# Patient Record
Sex: MALE | Race: WHITE | NOT HISPANIC OR LATINO | Employment: OTHER | URBAN - METROPOLITAN AREA
[De-identification: names, ages, dates, MRNs, and addresses within clinical notes are randomized per-mention and may not be internally consistent; named-entity substitution may affect disease eponyms.]

---

## 2018-07-21 ENCOUNTER — APPOINTMENT (OUTPATIENT)
Dept: CT IMAGING | Facility: HOSPITAL | Age: 63
End: 2018-07-21

## 2018-07-21 ENCOUNTER — APPOINTMENT (OUTPATIENT)
Dept: GENERAL RADIOLOGY | Facility: HOSPITAL | Age: 63
End: 2018-07-21

## 2018-07-21 ENCOUNTER — APPOINTMENT (OUTPATIENT)
Dept: ULTRASOUND IMAGING | Facility: HOSPITAL | Age: 63
End: 2018-07-21

## 2018-07-21 ENCOUNTER — HOSPITAL ENCOUNTER (EMERGENCY)
Facility: HOSPITAL | Age: 63
Discharge: HOME | End: 2018-07-21
Attending: EMERGENCY MEDICINE | Admitting: EMERGENCY MEDICINE

## 2018-07-21 VITALS
SYSTOLIC BLOOD PRESSURE: 110 MMHG | HEIGHT: 78 IN | TEMPERATURE: 97.7 F | BODY MASS INDEX: 36.45 KG/M2 | WEIGHT: 315 LBS | OXYGEN SATURATION: 94 % | HEART RATE: 58 BPM | RESPIRATION RATE: 16 BRPM | DIASTOLIC BLOOD PRESSURE: 63 MMHG

## 2018-07-21 DIAGNOSIS — E66.9 DIABETES MELLITUS TYPE 2 IN OBESE (HCC): ICD-10-CM

## 2018-07-21 DIAGNOSIS — Z86.79 HISTORY OF CORONARY ARTERY DISEASE: ICD-10-CM

## 2018-07-21 DIAGNOSIS — K83.8 BILIARY SLUDGE DETERMINED BY ULTRASOUND: ICD-10-CM

## 2018-07-21 DIAGNOSIS — Z86.79 HISTORY OF HYPERTENSION: ICD-10-CM

## 2018-07-21 DIAGNOSIS — K80.50 BILIARY COLIC: Primary | ICD-10-CM

## 2018-07-21 DIAGNOSIS — E11.69 DIABETES MELLITUS TYPE 2 IN OBESE (HCC): ICD-10-CM

## 2018-07-21 DIAGNOSIS — Z86.79 HISTORY OF ATRIAL FIBRILLATION: ICD-10-CM

## 2018-07-21 DIAGNOSIS — R07.9 RIGHT-SIDED CHEST PAIN: ICD-10-CM

## 2018-07-21 LAB
ALBUMIN SERPL-MCNC: 4.6 G/DL (ref 3.2–4.8)
ALBUMIN/GLOB SERPL: 1.5 G/DL (ref 1.5–2.5)
ALP SERPL-CCNC: 59 U/L (ref 25–100)
ALT SERPL W P-5'-P-CCNC: 46 U/L (ref 7–40)
ANION GAP SERPL CALCULATED.3IONS-SCNC: 9 MMOL/L (ref 3–11)
AST SERPL-CCNC: 38 U/L (ref 0–33)
BASOPHILS # BLD AUTO: 0.03 10*3/MM3 (ref 0–0.2)
BASOPHILS NFR BLD AUTO: 0.5 % (ref 0–1)
BILIRUB SERPL-MCNC: 0.4 MG/DL (ref 0.3–1.2)
BILIRUB UR QL STRIP: NEGATIVE
BNP SERPL-MCNC: 9 PG/ML (ref 0–100)
BUN BLD-MCNC: 28 MG/DL (ref 9–23)
BUN/CREAT SERPL: 23 (ref 7–25)
CALCIUM SPEC-SCNC: 9.7 MG/DL (ref 8.7–10.4)
CHLORIDE SERPL-SCNC: 106 MMOL/L (ref 99–109)
CLARITY UR: CLEAR
CO2 SERPL-SCNC: 24 MMOL/L (ref 20–31)
COLOR UR: YELLOW
CREAT BLD-MCNC: 1.22 MG/DL (ref 0.6–1.3)
DEPRECATED RDW RBC AUTO: 44.1 FL (ref 37–54)
EOSINOPHIL # BLD AUTO: 0.14 10*3/MM3 (ref 0–0.3)
EOSINOPHIL NFR BLD AUTO: 2.4 % (ref 0–3)
ERYTHROCYTE [DISTWIDTH] IN BLOOD BY AUTOMATED COUNT: 12.7 % (ref 11.3–14.5)
GFR SERPL CREATININE-BSD FRML MDRD: 60 ML/MIN/1.73
GLOBULIN UR ELPH-MCNC: 3 GM/DL
GLUCOSE BLD-MCNC: 182 MG/DL (ref 70–100)
GLUCOSE UR STRIP-MCNC: NEGATIVE MG/DL
HCT VFR BLD AUTO: 41.2 % (ref 38.9–50.9)
HGB BLD-MCNC: 13.6 G/DL (ref 13.1–17.5)
HGB UR QL STRIP.AUTO: NEGATIVE
HOLD SPECIMEN: NORMAL
HOLD SPECIMEN: NORMAL
IMM GRANULOCYTES # BLD: 0.02 10*3/MM3 (ref 0–0.03)
IMM GRANULOCYTES NFR BLD: 0.3 % (ref 0–0.6)
KETONES UR QL STRIP: NEGATIVE
LEUKOCYTE ESTERASE UR QL STRIP.AUTO: NEGATIVE
LIPASE SERPL-CCNC: 41 U/L (ref 6–51)
LYMPHOCYTES # BLD AUTO: 1.17 10*3/MM3 (ref 0.6–4.8)
LYMPHOCYTES NFR BLD AUTO: 19.8 % (ref 24–44)
MCH RBC QN AUTO: 31.5 PG (ref 27–31)
MCHC RBC AUTO-ENTMCNC: 33 G/DL (ref 32–36)
MCV RBC AUTO: 95.4 FL (ref 80–99)
MONOCYTES # BLD AUTO: 0.26 10*3/MM3 (ref 0–1)
MONOCYTES NFR BLD AUTO: 4.4 % (ref 0–12)
NEUTROPHILS # BLD AUTO: 4.29 10*3/MM3 (ref 1.5–8.3)
NEUTROPHILS NFR BLD AUTO: 72.6 % (ref 41–71)
NITRITE UR QL STRIP: NEGATIVE
PH UR STRIP.AUTO: <=5 [PH] (ref 5–8)
PLATELET # BLD AUTO: 201 10*3/MM3 (ref 150–450)
PMV BLD AUTO: 10.3 FL (ref 6–12)
POTASSIUM BLD-SCNC: 4.8 MMOL/L (ref 3.5–5.5)
PROT SERPL-MCNC: 7.6 G/DL (ref 5.7–8.2)
PROT UR QL STRIP: NEGATIVE
RBC # BLD AUTO: 4.32 10*6/MM3 (ref 4.2–5.76)
SODIUM BLD-SCNC: 139 MMOL/L (ref 132–146)
SP GR UR STRIP: 1.03 (ref 1–1.03)
TROPONIN I SERPL-MCNC: 0 NG/ML (ref 0–0.07)
TROPONIN I SERPL-MCNC: 0 NG/ML (ref 0–0.07)
UROBILINOGEN UR QL STRIP: NORMAL
WBC NRBC COR # BLD: 5.91 10*3/MM3 (ref 3.5–10.8)
WHOLE BLOOD HOLD SPECIMEN: NORMAL
WHOLE BLOOD HOLD SPECIMEN: NORMAL

## 2018-07-21 PROCEDURE — 96374 THER/PROPH/DIAG INJ IV PUSH: CPT

## 2018-07-21 PROCEDURE — 81003 URINALYSIS AUTO W/O SCOPE: CPT | Performed by: EMERGENCY MEDICINE

## 2018-07-21 PROCEDURE — 83690 ASSAY OF LIPASE: CPT | Performed by: EMERGENCY MEDICINE

## 2018-07-21 PROCEDURE — 93005 ELECTROCARDIOGRAM TRACING: CPT | Performed by: EMERGENCY MEDICINE

## 2018-07-21 PROCEDURE — 25010000002 ONDANSETRON PER 1 MG: Performed by: EMERGENCY MEDICINE

## 2018-07-21 PROCEDURE — 25010000002 KETOROLAC TROMETHAMINE PER 15 MG: Performed by: EMERGENCY MEDICINE

## 2018-07-21 PROCEDURE — 96375 TX/PRO/DX INJ NEW DRUG ADDON: CPT

## 2018-07-21 PROCEDURE — 71275 CT ANGIOGRAPHY CHEST: CPT

## 2018-07-21 PROCEDURE — 0 IOPAMIDOL PER 1 ML: Performed by: EMERGENCY MEDICINE

## 2018-07-21 PROCEDURE — 76705 ECHO EXAM OF ABDOMEN: CPT

## 2018-07-21 PROCEDURE — 80053 COMPREHEN METABOLIC PANEL: CPT | Performed by: EMERGENCY MEDICINE

## 2018-07-21 PROCEDURE — 83880 ASSAY OF NATRIURETIC PEPTIDE: CPT | Performed by: EMERGENCY MEDICINE

## 2018-07-21 PROCEDURE — 85025 COMPLETE CBC W/AUTO DIFF WBC: CPT | Performed by: EMERGENCY MEDICINE

## 2018-07-21 PROCEDURE — 99284 EMERGENCY DEPT VISIT MOD MDM: CPT

## 2018-07-21 PROCEDURE — 84484 ASSAY OF TROPONIN QUANT: CPT

## 2018-07-21 RX ORDER — SODIUM CHLORIDE 0.9 % (FLUSH) 0.9 %
10 SYRINGE (ML) INJECTION AS NEEDED
Status: DISCONTINUED | OUTPATIENT
Start: 2018-07-21 | End: 2018-07-21 | Stop reason: HOSPADM

## 2018-07-21 RX ORDER — DICYCLOMINE HYDROCHLORIDE 10 MG/1
20 CAPSULE ORAL ONCE
Status: COMPLETED | OUTPATIENT
Start: 2018-07-21 | End: 2018-07-21

## 2018-07-21 RX ORDER — ASPIRIN 81 MG/1
81 TABLET ORAL DAILY
COMMUNITY

## 2018-07-21 RX ORDER — CHLORAL HYDRATE 500 MG
1000 CAPSULE ORAL NIGHTLY
COMMUNITY

## 2018-07-21 RX ORDER — ROSUVASTATIN CALCIUM 20 MG/1
20 TABLET, COATED ORAL DAILY
COMMUNITY
End: 2020-09-10

## 2018-07-21 RX ORDER — METOPROLOL TARTRATE 50 MG/1
50 TABLET, FILM COATED ORAL 2 TIMES DAILY
COMMUNITY
End: 2019-07-24

## 2018-07-21 RX ORDER — ALLOPURINOL 300 MG/1
300 TABLET ORAL DAILY
COMMUNITY
End: 2019-07-24

## 2018-07-21 RX ORDER — LISINOPRIL 20 MG/1
20 TABLET ORAL DAILY
COMMUNITY

## 2018-07-21 RX ORDER — KETOROLAC TROMETHAMINE 15 MG/ML
10 INJECTION, SOLUTION INTRAMUSCULAR; INTRAVENOUS ONCE
Status: COMPLETED | OUTPATIENT
Start: 2018-07-21 | End: 2018-07-21

## 2018-07-21 RX ORDER — ONDANSETRON 2 MG/ML
4 INJECTION INTRAMUSCULAR; INTRAVENOUS ONCE
Status: COMPLETED | OUTPATIENT
Start: 2018-07-21 | End: 2018-07-21

## 2018-07-21 RX ORDER — MAGNESIUM OXIDE 400 MG/1
400 TABLET ORAL DAILY
COMMUNITY
End: 2020-09-10

## 2018-07-21 RX ORDER — PROBENECID 500 MG/1
500 TABLET, FILM COATED ORAL 2 TIMES DAILY
COMMUNITY
End: 2019-07-24

## 2018-07-21 RX ORDER — RANITIDINE 150 MG/1
150 TABLET ORAL 2 TIMES DAILY
Qty: 28 TABLET | Refills: 0 | Status: SHIPPED | OUTPATIENT
Start: 2018-07-21 | End: 2019-07-24

## 2018-07-21 RX ORDER — SOTALOL HYDROCHLORIDE 80 MG/1
80 TABLET ORAL 2 TIMES DAILY
COMMUNITY
End: 2019-08-23 | Stop reason: HOSPADM

## 2018-07-21 RX ORDER — BUPROPION HYDROCHLORIDE 150 MG/1
300 TABLET ORAL DAILY
COMMUNITY
End: 2019-07-24

## 2018-07-21 RX ORDER — TRAMADOL HYDROCHLORIDE 50 MG/1
50 TABLET ORAL EVERY 6 HOURS PRN
Qty: 12 TABLET | Refills: 0 | Status: SHIPPED | OUTPATIENT
Start: 2018-07-21 | End: 2019-07-24

## 2018-07-21 RX ADMIN — KETOROLAC TROMETHAMINE 10 MG: 15 INJECTION, SOLUTION INTRAMUSCULAR; INTRAVENOUS at 12:50

## 2018-07-21 RX ADMIN — ONDANSETRON 4 MG: 2 INJECTION INTRAMUSCULAR; INTRAVENOUS at 12:48

## 2018-07-21 RX ADMIN — IOPAMIDOL 75 ML: 755 INJECTION, SOLUTION INTRAVENOUS at 12:23

## 2018-07-21 RX ADMIN — Medication 10 ML: at 12:50

## 2018-07-21 RX ADMIN — DICYCLOMINE HYDROCHLORIDE 20 MG: 10 CAPSULE ORAL at 12:48

## 2018-07-21 RX ADMIN — Medication 10 ML: at 12:49

## 2018-08-07 ENCOUNTER — TRANSCRIBE ORDERS (OUTPATIENT)
Dept: ADMINISTRATIVE | Facility: HOSPITAL | Age: 63
End: 2018-08-07

## 2018-08-07 ENCOUNTER — TRANSCRIBE ORDERS (OUTPATIENT)
Dept: LAB | Facility: HOSPITAL | Age: 63
End: 2018-08-07

## 2018-08-07 ENCOUNTER — LAB (OUTPATIENT)
Dept: LAB | Facility: HOSPITAL | Age: 63
End: 2018-08-07

## 2018-08-07 DIAGNOSIS — R10.11 ABDOMINAL PAIN, RIGHT UPPER QUADRANT: ICD-10-CM

## 2018-08-07 DIAGNOSIS — R10.11 RUQ ABDOMINAL PAIN: Primary | ICD-10-CM

## 2018-08-07 DIAGNOSIS — R10.11 ABDOMINAL PAIN, RIGHT UPPER QUADRANT: Primary | ICD-10-CM

## 2018-08-07 PROCEDURE — 83835 ASSAY OF METANEPHRINES: CPT

## 2018-08-07 PROCEDURE — 36415 COLL VENOUS BLD VENIPUNCTURE: CPT

## 2018-08-09 LAB
METANEPHRINE, PL: 14 PG/ML (ref 0–62)
NORMETANEPHRINE, PL: 77 PG/ML (ref 0–145)

## 2018-08-17 ENCOUNTER — HOSPITAL ENCOUNTER (OUTPATIENT)
Dept: NUCLEAR MEDICINE | Facility: HOSPITAL | Age: 63
Discharge: HOME | End: 2018-08-17
Attending: SURGERY

## 2018-08-17 DIAGNOSIS — R10.11 RUQ ABDOMINAL PAIN: ICD-10-CM

## 2018-08-17 PROCEDURE — 78227 HEPATOBIL SYST IMAGE W/DRUG: CPT

## 2018-08-17 PROCEDURE — A9537 TC99M MEBROFENIN: HCPCS | Performed by: SURGERY

## 2018-08-17 PROCEDURE — 0 TECHNETIUM TC 99M MEBROFENIN KIT: Performed by: SURGERY

## 2018-08-17 PROCEDURE — 25010000002 SINCALIDE PER 5 MCG: Performed by: SURGERY

## 2018-08-17 RX ORDER — KIT FOR THE PREPARATION OF TECHNETIUM TC 99M MEBROFENIN 45 MG/10ML
1 INJECTION, POWDER, LYOPHILIZED, FOR SOLUTION INTRAVENOUS
Status: COMPLETED | OUTPATIENT
Start: 2018-08-17 | End: 2018-08-17

## 2018-08-17 RX ADMIN — MEBROFENIN 1 DOSE: 45 INJECTION, POWDER, LYOPHILIZED, FOR SOLUTION INTRAVENOUS at 10:35

## 2018-08-17 RX ADMIN — SINCALIDE 2.9 MCG: 5 INJECTION, POWDER, LYOPHILIZED, FOR SOLUTION INTRAVENOUS at 11:45

## 2018-08-29 ENCOUNTER — OFFICE VISIT (OUTPATIENT)
Dept: GASTROENTEROLOGY | Facility: CLINIC | Age: 63
End: 2018-08-29

## 2018-08-29 VITALS
BODY MASS INDEX: 36.45 KG/M2 | SYSTOLIC BLOOD PRESSURE: 132 MMHG | DIASTOLIC BLOOD PRESSURE: 82 MMHG | HEART RATE: 67 BPM | OXYGEN SATURATION: 95 % | WEIGHT: 315 LBS | HEIGHT: 78 IN | RESPIRATION RATE: 16 BRPM | TEMPERATURE: 97.7 F

## 2018-08-29 DIAGNOSIS — R10.9 ABDOMINAL PAIN, UNSPECIFIED ABDOMINAL LOCATION: Primary | ICD-10-CM

## 2018-08-29 PROCEDURE — 99204 OFFICE O/P NEW MOD 45 MIN: CPT | Performed by: INTERNAL MEDICINE

## 2018-08-29 RX ORDER — PANTOPRAZOLE SODIUM 40 MG/1
40 TABLET, DELAYED RELEASE ORAL 2 TIMES DAILY
Qty: 180 TABLET | Refills: 3 | Status: SHIPPED | OUTPATIENT
Start: 2018-08-29

## 2018-08-29 NOTE — PROGRESS NOTES
PCP: Monika العراقي APRN    Chief Complaint   Patient presents with   • Abdominal Pain        History of Present Illness:   Raoul Roberto is a 62 y.o. male who presents to the GI clinic for abdominal pain. Reports a history of atrial fibrillation, dm2 and 3 months of epigastric pain. Also has a history of ruq pain that worsened on inspiration. He has had an u/s showing gallbladder sludge and an unremarkable HIDA scan. T bili 0.4 , alk phos < 70.  Now, he reports postprandial diaphoresis and epigastric discomfort associated with 3 loose dark stools. Stools can appear black but mostly green. Pain epigastric intermittent, 5-7/10, nonradiating.    Past Medical History:   Diagnosis Date   • Atrial fibrillation (CMS/HCC)    • Diabetes mellitus (CMS/HCC)    • Gout    • Hyperlipidemia    • Hypertension        Past Surgical History:   Procedure Laterality Date   • KNEE SURGERY     • ROTATOR CUFF REPAIR           Current Outpatient Prescriptions:   •  allopurinol (ZYLOPRIM) 300 MG tablet, Take 300 mg by mouth Daily., Disp: , Rfl:   •  aspirin 81 MG EC tablet, Take 81 mg by mouth Daily., Disp: , Rfl:   •  buPROPion XL (WELLBUTRIN XL) 150 MG 24 hr tablet, Take 300 mg by mouth Daily., Disp: , Rfl:   •  hyoscyamine (LEVSIN) 0.125 MG SL tablet, Take 1 tablet by mouth Every 4 (Four) Hours As Needed for Cramping., Disp: 15 tablet, Rfl: 0  •  lisinopril (PRINIVIL,ZESTRIL) 20 MG tablet, Take 20 mg by mouth Daily., Disp: , Rfl:   •  magnesium oxide (MAG-OX) 400 MG tablet, Take 400 mg by mouth Daily., Disp: , Rfl:   •  metFORMIN (GLUCOPHAGE) 500 MG tablet, Take 500 mg by mouth 3 (Three) Times a Day., Disp: , Rfl:   •  metoprolol tartrate (LOPRESSOR) 50 MG tablet, Take 50 mg by mouth 2 (Two) Times a Day., Disp: , Rfl:   •  Omega-3 Fatty Acids (FISH OIL) 1000 MG capsule capsule, Take  by mouth Daily With Breakfast., Disp: , Rfl:   •  probenecid (BENEMID) 500 MG tablet, Take 500 mg by mouth 2 (Two) Times a Day., Disp: , Rfl:   •   raNITIdine (ZANTAC) 150 MG tablet, Take 1 tablet by mouth 2 (Two) Times a Day., Disp: 28 tablet, Rfl: 0  •  rivaroxaban (XARELTO) 20 MG tablet, Take 20 mg by mouth Daily., Disp: , Rfl:   •  rosuvastatin (CRESTOR) 20 MG tablet, Take 20 mg by mouth Daily., Disp: , Rfl:   •  sotalol (BETAPACE) 80 MG tablet, Take 80 mg by mouth 2 (Two) Times a Day., Disp: , Rfl:   •  traMADol (ULTRAM) 50 MG tablet, Take 1 tablet by mouth Every 6 (Six) Hours As Needed for Moderate Pain ., Disp: 12 tablet, Rfl: 0    No Known Allergies    History reviewed. No pertinent family history.    Social History     Social History   • Marital status:      Spouse name: N/A   • Number of children: N/A   • Years of education: N/A     Occupational History   • Not on file.     Social History Main Topics   • Smoking status: Never Smoker   • Smokeless tobacco: Not on file   • Alcohol use Yes      Comment: socially   • Drug use: No   • Sexual activity: Defer     Other Topics Concern   • Not on file     Social History Narrative   • No narrative on file       Review of Systems   Constitutional: Positive for appetite change.        Abnormal sweating   Gastrointestinal: Positive for abdominal distention, abdominal pain (Cramping, sharp pain in RLQ), diarrhea, nausea and vomiting.   Musculoskeletal: Positive for back pain (Right shoulder area).   Allergic/Immunologic: Positive for food allergies (Wheat, grains, tree nuts).   All other systems reviewed and are negative.    All other systems reviewed and are negative.    Vitals:    08/29/18 1108   BP: 132/82   Pulse: 67   Resp: 16   Temp: 97.7 °F (36.5 °C)   SpO2: 95%       Physical Exam  General Appearance:  Vitals as above. no acute distress  Head/face:  Normocephalic, atraumatic  Eyes:   EOMI, no conjunctivitis or icterus   Nose/Sinuses:  Nares patent bilaterally without discharge or lesions  Mouth/Throat:  Posterior pharynx is pink without drainage or exudates;  dentition is in good condition and  repair  Neck:  trachea is midline, no thyromegaly  Lungs:  Clear to auscultation bilaterally  Heart:  irreg irreg rhythm  Abdomen:  Soft, non-tender to palpation, no obvious masses, bowel sounds positive in four quadrants; no abdominal bruits; no guarding or rebound tenderness  Neurologic:  Alert; no focal deficits; age appropriate behavior and speech  Psychiatric: mood and affect are congruent  Vascular: extremities without edema  Skin: no rash or cyanosis.      Assessment/Plan  1.) Post prandial epigastric pain, persistent x 3 months  2.) history of ruq pain  3.) Xarelto and asa use  4.) chronic a. Fib  5.) Post prandial diaphoresis  6.) History of testosterone use  7.) Biliary sludge    Not sure what to make of his post prandial diaphoresis and I see that his pcp has assessed urinary metanephrines.  He does report a history of hypopituitarism and I defer to pcp/endocrinologist.     His cmp is without signs of biliary obstruction and perhaps he is having PUD/gastritis.  Will assess EGD/colonoscopy (last colon 9 years ago) off xarelto x 48 hours if ok with rx physician and offer empiric rx protonix  while he awaits his cardiology appointment in 2 weeks.  Following his cardiac appointment, will plan on egd/colon unless there are plans for stress testing.    rtc in 3 months  Amador Fernandes MD  8/29/2018

## 2018-09-12 ENCOUNTER — TELEPHONE (OUTPATIENT)
Dept: GASTROENTEROLOGY | Facility: CLINIC | Age: 63
End: 2018-09-12

## 2018-09-12 NOTE — TELEPHONE ENCOUNTER
Patient wife called to let you know that medication is not working. Patient would like to know if he can go ahead a schedule a scope. Wife states patient is not going to see Dr. Dempsey for 1 month.

## 2018-09-27 ENCOUNTER — PREP FOR SURGERY (OUTPATIENT)
Dept: OTHER | Facility: HOSPITAL | Age: 63
End: 2018-09-27

## 2018-09-27 DIAGNOSIS — R10.9 ABDOMINAL PAIN, UNSPECIFIED ABDOMINAL LOCATION: ICD-10-CM

## 2018-09-27 DIAGNOSIS — Z12.11 SCREEN FOR COLON CANCER: Primary | ICD-10-CM

## 2018-09-28 DIAGNOSIS — Z12.11 SCREENING FOR COLON CANCER: Primary | ICD-10-CM

## 2018-09-28 DIAGNOSIS — Z12.11 SCREENING FOR COLON CANCER: ICD-10-CM

## 2018-09-28 RX ORDER — PEG-3350, SODIUM SULFATE, SODIUM CHLORIDE, POTASSIUM CHLORIDE, SODIUM ASCORBATE AND ASCORBIC ACID 7.5-2.691G
KIT ORAL
Qty: 1 EACH | Refills: 0 | Status: SHIPPED | OUTPATIENT
Start: 2018-09-28 | End: 2018-09-28 | Stop reason: SDUPTHER

## 2018-09-28 RX ORDER — PEG-3350, SODIUM SULFATE, SODIUM CHLORIDE, POTASSIUM CHLORIDE, SODIUM ASCORBATE AND ASCORBIC ACID 7.5-2.691G
KIT ORAL
Qty: 1 EACH | Refills: 0 | Status: SHIPPED | OUTPATIENT
Start: 2018-09-28 | End: 2019-07-24

## 2018-10-02 ENCOUNTER — ANESTHESIA EVENT (OUTPATIENT)
Dept: GASTROENTEROLOGY | Facility: HOSPITAL | Age: 63
End: 2018-10-02

## 2018-10-02 ENCOUNTER — ANESTHESIA (OUTPATIENT)
Dept: GASTROENTEROLOGY | Facility: HOSPITAL | Age: 63
End: 2018-10-02

## 2018-10-02 ENCOUNTER — HOSPITAL ENCOUNTER (OUTPATIENT)
Facility: HOSPITAL | Age: 63
Setting detail: HOSPITAL OUTPATIENT SURGERY
Discharge: HOME | End: 2018-10-02
Attending: INTERNAL MEDICINE | Admitting: INTERNAL MEDICINE

## 2018-10-02 VITALS
BODY MASS INDEX: 33.95 KG/M2 | WEIGHT: 309 LBS | SYSTOLIC BLOOD PRESSURE: 122 MMHG | OXYGEN SATURATION: 98 % | DIASTOLIC BLOOD PRESSURE: 67 MMHG | TEMPERATURE: 97.2 F | RESPIRATION RATE: 20 BRPM | HEART RATE: 61 BPM

## 2018-10-02 DIAGNOSIS — R10.9 ABDOMINAL PAIN, UNSPECIFIED ABDOMINAL LOCATION: ICD-10-CM

## 2018-10-02 DIAGNOSIS — Z12.11 SCREEN FOR COLON CANCER: ICD-10-CM

## 2018-10-02 LAB — POTASSIUM BLDA-SCNC: 4.12 MMOL/L (ref 3.5–5.3)

## 2018-10-02 PROCEDURE — S0260 H&P FOR SURGERY: HCPCS | Performed by: INTERNAL MEDICINE

## 2018-10-02 PROCEDURE — 88305 TISSUE EXAM BY PATHOLOGIST: CPT | Performed by: INTERNAL MEDICINE

## 2018-10-02 PROCEDURE — 84132 ASSAY OF SERUM POTASSIUM: CPT | Performed by: ANESTHESIOLOGY

## 2018-10-02 PROCEDURE — 25010000002 PROPOFOL 1000 MG/ML EMULSION: Performed by: NURSE ANESTHETIST, CERTIFIED REGISTERED

## 2018-10-02 RX ORDER — EPHEDRINE SULFATE 50 MG/ML
INJECTION, SOLUTION INTRAVENOUS AS NEEDED
Status: DISCONTINUED | OUTPATIENT
Start: 2018-10-02 | End: 2018-10-02 | Stop reason: SURG

## 2018-10-02 RX ORDER — SODIUM CHLORIDE, SODIUM LACTATE, POTASSIUM CHLORIDE, CALCIUM CHLORIDE 600; 310; 30; 20 MG/100ML; MG/100ML; MG/100ML; MG/100ML
20 INJECTION, SOLUTION INTRAVENOUS CONTINUOUS
Status: DISCONTINUED | OUTPATIENT
Start: 2018-10-02 | End: 2018-10-03 | Stop reason: HOSPADM

## 2018-10-02 RX ORDER — LIDOCAINE HYDROCHLORIDE 10 MG/ML
0.5 INJECTION, SOLUTION EPIDURAL; INFILTRATION; INTRACAUDAL; PERINEURAL ONCE AS NEEDED
Status: CANCELLED | OUTPATIENT
Start: 2018-10-02

## 2018-10-02 RX ORDER — FENTANYL CITRATE 50 UG/ML
50 INJECTION, SOLUTION INTRAMUSCULAR; INTRAVENOUS
Status: DISCONTINUED | OUTPATIENT
Start: 2018-10-02 | End: 2018-10-02 | Stop reason: HOSPADM

## 2018-10-02 RX ORDER — SODIUM CHLORIDE, SODIUM LACTATE, POTASSIUM CHLORIDE, CALCIUM CHLORIDE 600; 310; 30; 20 MG/100ML; MG/100ML; MG/100ML; MG/100ML
9 INJECTION, SOLUTION INTRAVENOUS CONTINUOUS
Status: CANCELLED | OUTPATIENT
Start: 2018-10-02

## 2018-10-02 RX ORDER — FAMOTIDINE 20 MG/1
20 TABLET, FILM COATED ORAL ONCE
Status: CANCELLED | OUTPATIENT
Start: 2018-10-02 | End: 2018-10-02

## 2018-10-02 RX ORDER — FAMOTIDINE 10 MG/ML
20 INJECTION, SOLUTION INTRAVENOUS ONCE
Status: CANCELLED | OUTPATIENT
Start: 2018-10-02 | End: 2018-10-02

## 2018-10-02 RX ORDER — SODIUM CHLORIDE 0.9 % (FLUSH) 0.9 %
3 SYRINGE (ML) INJECTION EVERY 12 HOURS SCHEDULED
Status: CANCELLED | OUTPATIENT
Start: 2018-10-02

## 2018-10-02 RX ORDER — SODIUM CHLORIDE 0.9 % (FLUSH) 0.9 %
3-10 SYRINGE (ML) INJECTION AS NEEDED
Status: CANCELLED | OUTPATIENT
Start: 2018-10-02

## 2018-10-02 RX ORDER — LIDOCAINE HYDROCHLORIDE 10 MG/ML
INJECTION, SOLUTION EPIDURAL; INFILTRATION; INTRACAUDAL; PERINEURAL AS NEEDED
Status: DISCONTINUED | OUTPATIENT
Start: 2018-10-02 | End: 2018-10-02 | Stop reason: SURG

## 2018-10-02 RX ADMIN — EPHEDRINE SULFATE 10 MG: 50 INJECTION INTRAMUSCULAR; INTRAVENOUS; SUBCUTANEOUS at 08:29

## 2018-10-02 RX ADMIN — EPHEDRINE SULFATE 10 MG: 50 INJECTION INTRAMUSCULAR; INTRAVENOUS; SUBCUTANEOUS at 08:36

## 2018-10-02 RX ADMIN — LIDOCAINE HYDROCHLORIDE 50 MG: 10 INJECTION, SOLUTION EPIDURAL; INFILTRATION; INTRACAUDAL; PERINEURAL at 08:14

## 2018-10-02 RX ADMIN — PROPOFOL 200 MCG/KG/MIN: 10 INJECTION, EMULSION INTRAVENOUS at 08:15

## 2018-10-02 RX ADMIN — SODIUM CHLORIDE, POTASSIUM CHLORIDE, SODIUM LACTATE AND CALCIUM CHLORIDE: 600; 310; 30; 20 INJECTION, SOLUTION INTRAVENOUS at 08:09

## 2018-10-02 NOTE — ANESTHESIA POSTPROCEDURE EVALUATION
Patient: Raoul Roberto    Procedure Summary     Date:  10/02/18 Room / Location:   [facility] /  [facility]    Anesthesia Start:  0810 Anesthesia Stop:      Procedures:       COLONOSCOPY (N/A )      ESOPHAGOGASTRODUODENOSCOPY (N/A ) Diagnosis:       Screen for colon cancer      Abdominal pain, unspecified abdominal location      (Screen for colon cancer [Z12.11])      (Abdominal pain, unspecified abdominal location [R10.9])    Surgeon:  Guille Addison MD Provider:  Bradly Chavez MD    Anesthesia Type:  general ASA Status:  3          Anesthesia Type: general  Last vitals  BP   110/72   Temp   98   Pulse   68   Resp   16     SpO2   99     Post Anesthesia Care and Evaluation    Patient location during evaluation: PACU  Patient participation: complete - patient participated  Level of consciousness: awake and responsive to verbal stimuli  Pain score: 2  Pain management: adequate  Airway patency: patent  Anesthetic complications: No anesthetic complications    Cardiovascular status: acceptable  Respiratory status: acceptable  Hydration status: acceptable    Comments: Pt awake and responsive. SV. VSS. Report to RN. Patient Vitals in the past 24 hrs:  10/02/18 0701, BP:161/70, Temp:97.5 °F (36.4 °C), Temp src:Tympanic, Pulse:66, Resp:16, SpO2:96 %, Weight:(!) 140 kg (309 lb)  133/78. p 72. r 16. t 98.1

## 2018-10-02 NOTE — H&P
[facility]    Referring Provider: Guille Addison MD    Primary Care Provider: Monika العراقي APRN    Reason for Consultation: abd pain    Chief complaint abd pain    History of present illness:  Raoul Roberto is a 62 y.o. male who is referred for EGD and colonoscopy.  Has epigastric pain for last 3-4 mo. Has lost about 40 lbs.  Has been on PPI.  Has diarrhea after he eats.  Last colonoscopy 9 yrs ago.  Had negative cardiac workup and GB workup    Allergies:  Patient has no known allergies.    Scheduled Meds:        Infusions:    lactated ringers 20 mL/hr       PRN Meds:      Home Meds:  Prescriptions Prior to Admission   Medication Sig Dispense Refill Last Dose   • allopurinol (ZYLOPRIM) 300 MG tablet Take 300 mg by mouth Daily.   10/2/2018 at 0430   • aspirin 81 MG EC tablet Take 81 mg by mouth Daily.   10/1/2018 at pm   • buPROPion XL (WELLBUTRIN XL) 150 MG 24 hr tablet Take 300 mg by mouth Daily.   10/1/2018 at 1900   • lisinopril (PRINIVIL,ZESTRIL) 20 MG tablet Take 20 mg by mouth Daily.   10/1/2018 at 1900   • magnesium oxide (MAG-OX) 400 MG tablet Take 400 mg by mouth Daily.   10/1/2018 at 1900   • metFORMIN (GLUCOPHAGE) 500 MG tablet Take 500 mg by mouth 3 (Three) Times a Day.   10/1/2018 at 1900   • metoprolol tartrate (LOPRESSOR) 50 MG tablet Take 50 mg by mouth 2 (Two) Times a Day.   10/2/2018 at 0430   • Omega-3 Fatty Acids (FISH OIL) 1000 MG capsule capsule Take  by mouth Daily With Breakfast.   10/1/2018 at 1900   • pantoprazole (PROTONIX) 40 MG EC tablet Take 1 tablet by mouth 2 (Two) Times a Day. 180 tablet 3 10/2/2018 at 0430   • PEG-KCl-NaCl-NaSulf-Na Asc-C (MOVIPREP) 100 g reconstituted solution powder Dispense 1 kit. Follow instructions that were mailed to your home. If you didn't receive these call [phone].. 1 each 0 10/1/2018 at Unknown time   • probenecid (BENEMID) 500 MG tablet Take 500 mg by mouth 2 (Two) Times a Day.   10/2/2018 at 0430   • raNITIdine (ZANTAC) 150  MG tablet Take 1 tablet by mouth 2 (Two) Times a Day. 28 tablet 0 10/2/2018 at Unknown time   • rosuvastatin (CRESTOR) 20 MG tablet Take 20 mg by mouth Daily.   10/1/2018 at 1900   • sotalol (BETAPACE) 80 MG tablet Take 80 mg by mouth 2 (Two) Times a Day.   10/2/2018 at 0430   • hyoscyamine (LEVSIN) 0.125 MG SL tablet Take 1 tablet by mouth Every 4 (Four) Hours As Needed for Cramping. 15 tablet 0 Unknown at Unknown time   • rivaroxaban (XARELTO) 20 MG tablet Take 20 mg by mouth Daily.   9/29/2018   • traMADol (ULTRAM) 50 MG tablet Take 1 tablet by mouth Every 6 (Six) Hours As Needed for Moderate Pain . 12 tablet 0 Unknown at Unknown time       ROS: Review of Systems   Constitutional: Positive for unexpected weight change.   HENT: Negative for trouble swallowing.    Respiratory: Negative for shortness of breath.    Cardiovascular: Negative for chest pain.   Gastrointestinal: Positive for abdominal pain and diarrhea.   Genitourinary: Negative for dysuria.   Musculoskeletal: Negative for arthralgias.   Skin: Negative for color change.   Allergic/Immunologic: Negative for immunocompromised state.   Neurological: Negative for weakness.   Hematological: Negative for adenopathy.   Psychiatric/Behavioral: Negative for agitation.       PAST MED HX: Pt  has a past medical history of Arthritis; Atrial fibrillation (CMS/HCC); Diabetes mellitus (CMS/HCC); Gout; Hyperlipidemia; Hypertension; Kidney stone; and Seasonal allergies.  PAST SURG HX: Pt  has a past surgical history that includes Knee surgery and Rotator cuff repair.  FAM HX: family history is not on file.  SOC HX: Pt  reports that he has never smoked. He does not have any smokeless tobacco history on file. He reports that he drinks alcohol. He reports that he does not use drugs.    /70 (BP Location: Right arm, Patient Position: Lying)   Pulse 66   Temp 97.5 °F (36.4 °C) (Tympanic)   Resp 16   Wt (!) 140 kg (309 lb)   SpO2 96%   BMI 33.95 kg/m²     Physical  Exam  Wt Readings from Last 3 Encounters:   10/02/18 (!) 140 kg (309 lb)   08/29/18 (!) 146 kg (322 lb)   07/21/18 (!) 147 kg (323 lb)   ,body mass index is 33.95 kg/m².    General Well developed; well nourished; no acute distress.   Obese  ENT Good dentition.  Oral mucosa pink & moist without thrush or lesions.    Neck Neck supple; trachea midline. No thyromegaly  Resp CTA; no rhonchi, rales, or wheezes.  Respiration effort normal  CV RRR; ; no M/R/G. No lower extremity edema  GI Abd soft, NT, ND, normal active bowel sounds.  No HSM.  No abd hernia  Skin No rash; no lesions; no bruises.  Skin turgor normal  Musc No clubbing; no cyanosis.    Psych Oriented to time, place, and person.  Appropriate affect      Results Review:   I reviewed the patient's new clinical results.    Lab Results   Component Value Date    WBC 5.91 07/21/2018    HGB 13.6 07/21/2018    HCT 41.2 07/21/2018    MCV 95.4 07/21/2018     07/21/2018       Lab Results   Component Value Date    GLUCOSE 182 (H) 07/21/2018    BUN 28 (H) 07/21/2018    CREATININE 1.22 07/21/2018    EGFRIFNONA 60 (L) 07/21/2018    BCR 23.0 07/21/2018    CO2 24.0 07/21/2018    CALCIUM 9.7 07/21/2018    ALBUMIN 4.60 07/21/2018    AST 38 (H) 07/21/2018    ALT 46 (H) 07/21/2018       ASSESSMENTS/PLANS    # 1 abd pain  #2 wt loss      Plan EGD and colon        I discussed the patients findings and my recommendations with patient    Guille Addison MD  10/02/18  8:09 AM

## 2018-10-02 NOTE — DISCHARGE INSTRUCTIONS
General Anesthesia, Adult, Care After  These instructions provide you with information about caring for yourself after your procedure. Your health care provider may also give you more specific instructions. Your treatment has been planned according to current medical practices, but problems sometimes occur. Call your health care provider if you have any problems or questions after your procedure.  What can I expect after the procedure?  After the procedure, it is common to have:  · Vomiting.  · A sore throat.  · Mental slowness.    It is common to feel:  · Nauseous.  · Cold or shivery.  · Sleepy.  · Tired.  · Sore or achy, even in parts of your body where you did not have surgery.    Follow these instructions at home:  For at least 24 hours after the procedure:  · Do not:  ? Participate in activities where you could fall or become injured.  ? Drive.  ? Use heavy machinery.  ? Drink alcohol.  ? Take sleeping pills or medicines that cause drowsiness.  ? Make important decisions or sign legal documents.  ? Take care of children on your own.  · Rest.  Eating and drinking  · If you vomit, drink water, juice, or soup when you can drink without vomiting.  · Drink enough fluid to keep your urine clear or pale yellow.  · Make sure you have little or no nausea before eating solid foods.  · Follow the diet recommended by your health care provider.  General instructions  · Have a responsible adult stay with you until you are awake and alert.  · Return to your normal activities as told by your health care provider. Ask your health care provider what activities are safe for you.  · Take over-the-counter and prescription medicines only as told by your health care provider.  · If you smoke, do not smoke without supervision.  · Keep all follow-up visits as told by your health care provider. This is important.  Contact a health care provider if:  · You continue to have nausea or vomiting at home, and medicines are not helpful.  · You  cannot drink fluids or start eating again.  · You cannot urinate after 8-12 hours.  · You develop a skin rash.  · You have fever.  · You have increasing redness at the site of your procedure.  Get help right away if:  · You have difficulty breathing.  · You have chest pain.  · You have unexpected bleeding.  · You feel that you are having a life-threatening or urgent problem.  This information is not intended to replace advice given to you by your health care provider. Make sure you discuss any questions you have with your health care provider.  Document Released: 03/26/2002 Document Revised: 05/22/2017 Document Reviewed: 12/01/2016  [employer] Interactive Patient Education © 2018 [employer]  Esophagogastroduodenoscopy, Care After  Refer to this sheet in the next few weeks. These instructions provide you with information about caring for yourself after your procedure. Your health care provider may also give you more specific instructions. Your treatment has been planned according to current medical practices, but problems sometimes occur. Call your health care provider if you have any problems or questions after your procedure.  What can I expect after the procedure?  After the procedure, it is common to have:  · A sore throat.  · Nausea.  · Bloating.  · Dizziness.  · Fatigue.    Follow these instructions at home:  · Do not eat or drink anything until the numbing medicine (local anesthetic) has worn off and your gag reflex has returned. You will know that the local anesthetic has worn off when you can swallow comfortably.  · Do not drive for 24 hours if you received a medicine to help you relax (sedative).  · If your health care provider took a tissue sample for testing during the procedure, make sure to get your test results. This is your responsibility. Ask your health care provider or the department performing the test when your results will be ready.  · Keep all follow-up visits as told by your health care  provider. This is important.  Contact a health care provider if:  · You cannot stop coughing.  · You are not urinating.  · You are urinating less than usual.  Get help right away if:  · You have trouble swallowing.  · You cannot eat or drink.  · You have throat or chest pain that gets worse.  · You are dizzy or light-headed.  · You faint.  · You have nausea or vomiting.  · You have chills.  · You have a fever.  · You have severe abdominal pain.  · You have black, tarry, or bloody stools.  This information is not intended to replace advice given to you by your health care provider. Make sure you discuss any questions you have with your health care provider.  Document Released: 12/04/2013 Document Revised: 05/25/2017 Document Reviewed: 11/10/2016  [employer] Interactive Patient Education © 2018 [employer]  Colonoscopy, Adult, Care After  This sheet gives you information about how to care for yourself after your procedure. Your doctor may also give you more specific instructions. If you have problems or questions, call your doctor.  Follow these instructions at home:  General instructions    · For the first 24 hours after the procedure:  ? Do not drive or use machinery.  ? Do not sign important documents.  ? Do not drink alcohol.  ? Do your daily activities more slowly than normal.  ? Eat foods that are soft and easy to digest.  ? Rest often.  · Take over-the-counter or prescription medicines only as told by your doctor.  · It is up to you to get the results of your procedure. Ask your doctor, or the department performing the procedure, when your results will be ready.  To help cramping and bloating:  · Try walking around.  · Put heat on your belly (abdomen) as told by your doctor. Use a heat source that your doctor recommends, such as a moist heat pack or a heating pad.  ? Put a towel between your skin and the heat source.  ? Leave the heat on for 20-30 minutes.  ? Remove the heat if your skin turns bright red. This  is especially important if you cannot feel pain, heat, or cold. You can get burned.  Eating and drinking  · Drink enough fluid to keep your pee (urine) clear or pale yellow.  · Return to your normal diet as told by your doctor. Avoid heavy or fried foods that are hard to digest.  · Avoid drinking alcohol for as long as told by your doctor.  Contact a doctor if:  · You have blood in your poop (stool) 2-3 days after the procedure.  Get help right away if:  · You have more than a small amount of blood in your poop.  · You see large clumps of tissue (blood clots) in your poop.  · Your belly is swollen.  · You feel sick to your stomach (nauseous).  · You throw up (vomit).  · You have a fever.  · You have belly pain that gets worse, and medicine does not help your pain.  This information is not intended to replace advice given to you by your health care provider. Make sure you discuss any questions you have with your health care provider.  Document Released: 01/20/2012 Document Revised: 09/11/2017 Document Reviewed: 09/11/2017  [employer] Interactive Patient Education © 2017 [employer]

## 2018-10-02 NOTE — ANESTHESIA PREPROCEDURE EVALUATION
Anesthesia Evaluation     Patient summary reviewed and Nursing notes reviewed   NPO Solid Status: > 8 hours  NPO Liquid Status: > 8 hours           Airway   Mallampati: I  TM distance: >3 FB  Neck ROM: full  No difficulty expected  Dental - normal exam     Pulmonary    Cardiovascular     ECG reviewed    (+) hypertension, cardiac stents dysrhythmias Atrial Fib,       Neuro/Psych  GI/Hepatic/Renal/Endo    (+)   diabetes mellitus,     Musculoskeletal     Abdominal    Substance History      OB/GYN          Other                        Anesthesia Plan    ASA 3     general     intravenous induction   Anesthetic plan, all risks, benefits, and alternatives have been provided, discussed and informed consent has been obtained with: patient and spouse/significant other.    Plan discussed with CRNA.

## 2018-10-03 LAB
CYTO UR: NORMAL
LAB AP CASE REPORT: NORMAL
LAB AP CLINICAL INFORMATION: NORMAL
PATH REPORT.FINAL DX SPEC: NORMAL
PATH REPORT.GROSS SPEC: NORMAL

## 2018-12-11 ENCOUNTER — OFFICE VISIT (OUTPATIENT)
Dept: GASTROENTEROLOGY | Facility: CLINIC | Age: 63
End: 2018-12-11

## 2018-12-11 VITALS
BODY MASS INDEX: 36.45 KG/M2 | TEMPERATURE: 97.5 F | DIASTOLIC BLOOD PRESSURE: 78 MMHG | HEIGHT: 78 IN | WEIGHT: 315 LBS | RESPIRATION RATE: 22 BRPM | HEART RATE: 64 BPM | SYSTOLIC BLOOD PRESSURE: 132 MMHG | OXYGEN SATURATION: 99 %

## 2018-12-11 DIAGNOSIS — R10.9 CHRONIC ABDOMINAL PAIN: Primary | ICD-10-CM

## 2018-12-11 DIAGNOSIS — G89.29 CHRONIC ABDOMINAL PAIN: Primary | ICD-10-CM

## 2018-12-11 PROCEDURE — 99213 OFFICE O/P EST LOW 20 MIN: CPT | Performed by: INTERNAL MEDICINE

## 2018-12-11 NOTE — PROGRESS NOTES
PCP: Monika العراقي APRN    Chief Complaint   Patient presents with   • Follow-up       History of Present Illness:   Raoul Roberto is a 62 y.o. male who presents to GI clinic as a follow up for abdominal pain and wt loss. S/p egd colonoscopy and feels better on ppi (protonix). No further wt loss. Found to have 2 cm segment of benoit's esophagus and moderate size hiatal hernia.  No gib loss.    Past Medical History:   Diagnosis Date   • Arthritis    • Atrial fibrillation (CMS/HCC)    • Diabetes mellitus (CMS/HCC)    • Gout    • Hyperlipidemia    • Hypertension    • Kidney stone    • Seasonal allergies        Past Surgical History:   Procedure Laterality Date   • KNEE SURGERY     • ROTATOR CUFF REPAIR           Current Outpatient Medications:   •  allopurinol (ZYLOPRIM) 300 MG tablet, Take 300 mg by mouth Daily., Disp: , Rfl:   •  aspirin 81 MG EC tablet, Take 81 mg by mouth Daily., Disp: , Rfl:   •  buPROPion XL (WELLBUTRIN XL) 150 MG 24 hr tablet, Take 300 mg by mouth Daily., Disp: , Rfl:   •  hyoscyamine (LEVSIN) 0.125 MG SL tablet, Take 1 tablet by mouth Every 4 (Four) Hours As Needed for Cramping., Disp: 15 tablet, Rfl: 0  •  lisinopril (PRINIVIL,ZESTRIL) 20 MG tablet, Take 20 mg by mouth Daily., Disp: , Rfl:   •  magnesium oxide (MAG-OX) 400 MG tablet, Take 400 mg by mouth Daily., Disp: , Rfl:   •  metFORMIN (GLUCOPHAGE) 500 MG tablet, Take 500 mg by mouth 3 (Three) Times a Day., Disp: , Rfl:   •  metoprolol tartrate (LOPRESSOR) 50 MG tablet, Take 50 mg by mouth 2 (Two) Times a Day., Disp: , Rfl:   •  Omega-3 Fatty Acids (FISH OIL) 1000 MG capsule capsule, Take  by mouth Daily With Breakfast., Disp: , Rfl:   •  pantoprazole (PROTONIX) 40 MG EC tablet, Take 1 tablet by mouth 2 (Two) Times a Day., Disp: 180 tablet, Rfl: 3  •  PEG-KCl-NaCl-NaSulf-Na Asc-C (MOVIPREP) 100 g reconstituted solution powder, Dispense 1 kit. Follow instructions that were mailed to your home. If you didn't receive these call [phone].., Disp: 1 each, Rfl: 0  •  probenecid (BENEMID) 500 MG tablet, Take 500 mg by mouth 2 (Two) Times a Day., Disp: , Rfl:   •  raNITIdine (ZANTAC) 150 MG tablet, Take 1 tablet by mouth 2 (Two) Times a Day., Disp: 28 tablet, Rfl: 0  •  rivaroxaban (XARELTO) 20 MG tablet, Take 20 mg by mouth Daily., Disp: , Rfl:   •  rosuvastatin (CRESTOR) 20 MG tablet, Take 20 mg by mouth Daily., Disp: , Rfl:   •  sotalol (BETAPACE) 80 MG tablet, Take 80 mg by mouth 2 (Two) Times a Day., Disp: , Rfl:   •  traMADol (ULTRAM) 50 MG tablet, Take 1 tablet by mouth Every 6 (Six) Hours As Needed for Moderate Pain ., Disp: 12 tablet, Rfl: 0    No Known Allergies    History reviewed. No pertinent family history.    Social History     Socioeconomic History   • Marital status:      Spouse name: Not on file   • Number of children: Not on file   • Years of education: Not on file   • Highest education level: Not on file   Social Needs   • Financial resource strain: Not on file   • Food insecurity - worry: Not on file   • Food insecurity - inability: Not on file   • Transportation needs - medical: Not on file   • Transportation needs - non-medical: Not on file   Occupational History   • Not on file   Tobacco Use   • Smoking status: Never Smoker   Substance and Sexual Activity   • Alcohol use: Yes     Comment: socially   • Drug use: No   • Sexual activity: Defer   Other Topics Concern   • Not on file   Social History Narrative   • Not on file       Review of Systems   Constitutional: Negative.    HENT: Negative.    Eyes: Negative.    Respiratory: Negative.    Cardiovascular: Negative.    Gastrointestinal: Negative.    Endocrine: Negative.    Genitourinary: Negative.    Musculoskeletal: Negative.    Skin: Negative.    Allergic/Immunologic: Negative.    Neurological: Negative.    Hematological: Negative.    Psychiatric/Behavioral: Negative.          Vitals:    12/11/18 1322   BP: 132/78   Pulse: 64   Resp: 22   Temp: 97.5 °F (36.4 °C)    SpO2: 99%       Physical Exam  General Appearance:  Vitals as above. no acute distress  Head/face:  Normocephalic, atraumatic  Eyes:   EOMI, no conjunctivitis or icterus   Nose/Sinuses:  Nares patent bilaterally without discharge or lesions  Mouth/Throat:  Posterior pharynx is pink without drainage or exudates;  dentition is in good condition and repair  Neck:  trachea is midline, no thyromegaly  Neurologic:  Alert; no focal deficits; age appropriate behavior and speech  Psychiatric: mood and affect are congruent  Skin: no rash or cyanosis.  Abdomen: obese and soft/nt      Assessment/Plan  1.) Cisse's esophagus  rtc in 1 year on ppi therapy. Repeat egd 3 years    2.) Chronic abdominal pain  Workup included egd/colon. pcp performed hida and u/s.  Improved on ppi.  Continue and consideration for low fodmap diet.    rtc in 1 year.  If abdominal pain represents, consider CT.      Amador Fernandes MD  12/11/2018

## 2019-07-24 ENCOUNTER — CONSULT (OUTPATIENT)
Dept: CARDIOLOGY | Facility: CLINIC | Age: 64
End: 2019-07-24

## 2019-07-24 VITALS
DIASTOLIC BLOOD PRESSURE: 88 MMHG | BODY MASS INDEX: 36.45 KG/M2 | SYSTOLIC BLOOD PRESSURE: 120 MMHG | HEIGHT: 78 IN | WEIGHT: 315 LBS | HEART RATE: 63 BPM

## 2019-07-24 DIAGNOSIS — I10 ESSENTIAL HYPERTENSION: ICD-10-CM

## 2019-07-24 DIAGNOSIS — I25.10 CORONARY ARTERY DISEASE INVOLVING NATIVE CORONARY ARTERY OF NATIVE HEART WITHOUT ANGINA PECTORIS: ICD-10-CM

## 2019-07-24 DIAGNOSIS — I48.0 PAROXYSMAL ATRIAL FIBRILLATION (HCC): Primary | ICD-10-CM

## 2019-07-24 PROCEDURE — 99204 OFFICE O/P NEW MOD 45 MIN: CPT | Performed by: INTERNAL MEDICINE

## 2019-07-24 PROCEDURE — 93000 ELECTROCARDIOGRAM COMPLETE: CPT | Performed by: INTERNAL MEDICINE

## 2019-07-24 RX ORDER — CLONAZEPAM 0.5 MG/1
0.5 TABLET ORAL 3 TIMES DAILY PRN
COMMUNITY
End: 2019-12-18

## 2019-07-24 RX ORDER — ESCITALOPRAM OXALATE 20 MG/1
20 TABLET ORAL DAILY
COMMUNITY
End: 2019-12-18

## 2019-07-24 NOTE — PROGRESS NOTES
Electrophysiology Clinic Consult     Raoul Roberto  1955  [unfilled]  [unfilled]    07/24/19    DATE OF ADMISSION: (Not on file)  [facility] CARDIOLOGY    Monika العراقي, APRN  [address] [facility] [place] [address]    Chief Complaint   Patient presents with   • Atrial Fibrillation     Consult      Problem List:  1. Paroxysmal Atrial Fibrillation   a. CHADSVasc = 3 (DM2, CAD, HTN)  on Eliquis  b. Diagnosed 2016  c. Nuclear Stress Test 1/7/16: moderate in size/severity fixed apical defect with small fixed inferior defect, normal overall systolic function  d. 3/2018: Holter Monitor from outside hospital reported atrial fibrillation with RVR. HR  bpm, average 72 bpm (no strips for review)  e. Treated with Sotalol  f. Echocardiogram 9/25/18: EF 55-60%, LA moderately dilated. 3.8 cm. Mild MR  2. CAD:  a. Nuclear Stress Test 1/7/16: moderate in size/severity fixed apical defect with small fixed inferior defect, normal overall systolic function  b. LHC with stent to LAD - data deficit - [place], [place]  3. HTN  4. DM2  5. HLP  6. GERD  7. Peripheral neuropathy  8. Pituitary tumor - data deficit  9. SENTHIL - on bipap  10. Surgical History:  a. Right knee surgery  b. Kidney stone extraction      History of Present Illness:   63 year old WM with above stated past medical history who is referred to our clinic today by Dr. Parsons for further evaluation and management of PAF. He was diagnosed with atrial fibrillation in 2016 and has been treated with Sotalol and Metoprolol. He has episodes of atrial fibrillation several times per week, lasting 15-20 minutes, but longest episode three years ago lasted several hours. Symptoms include palpitations, weakness, SOB, and fatigue, which are described as moderate to severe in nature. Episodes are not triggered by anything, occur at rest usually and occur at random. No relieving factors. He has been on Sotalol 80 mg BID and was also on Metoprolol  with bradycardia. He has been off Metoprolol for a week now. His most recent episode of atrial fibrillation was yesterday. He has history of HTN and has well controlled BPs on his current medications.  He is on Eliquis and has been on it for several months without bleeding issues. No history of CVA or TIA. He has SENTHIL and wears Bipap nightly. He has a pituitary tumor that was being monitored yearly but his physician left town. He has not had follow up for 5 years. No thyroid issues. Occasional ETOH, two times per week, 1-2 beers. 2 cups coffee daily. No tobacco use.       No Known Allergies     Cannot display prior to admission medications because the patient has not been admitted in this contact.            Current Outpatient Medications:   •  apixaban (ELIQUIS) 5 MG tablet tablet, Take 5 mg by mouth 2 (Two) Times a Day., Disp: , Rfl:   •  aspirin 81 MG EC tablet, Take 81 mg by mouth Daily., Disp: , Rfl:   •  clonazePAM (KlonoPIN) 0.5 MG tablet, Take 0.5 mg by mouth 3 (Three) Times a Day As Needed for Seizures., Disp: , Rfl:   •  escitalopram (LEXAPRO) 20 MG tablet, Take 20 mg by mouth Daily., Disp: , Rfl:   •  FERROUS SULFATE PO, Take 27 mg by mouth Daily., Disp: , Rfl:   •  lisinopril (PRINIVIL,ZESTRIL) 20 MG tablet, Take 20 mg by mouth Daily., Disp: , Rfl:   •  magnesium oxide (MAG-OX) 400 MG tablet, Take 400 mg by mouth Daily., Disp: , Rfl:   •  metFORMIN (GLUCOPHAGE) 500 MG tablet, Take 500 mg by mouth 3 (Three) Times a Day., Disp: , Rfl:   •  Omega-3 Fatty Acids (FISH OIL) 1000 MG capsule capsule, Take  by mouth Daily With Breakfast., Disp: , Rfl:   •  pantoprazole (PROTONIX) 40 MG EC tablet, Take 1 tablet by mouth 2 (Two) Times a Day., Disp: 180 tablet, Rfl: 3  •  rosuvastatin (CRESTOR) 20 MG tablet, Take 20 mg by mouth Daily., Disp: , Rfl:   •  sotalol (BETAPACE) 80 MG tablet, Take 80 mg by mouth 2 (Two) Times a Day., Disp: , Rfl:     Social History     Socioeconomic History   • Marital status:       "Spouse name: Not on file   • Number of children: Not on file   • Years of education: Not on file   • Highest education level: Not on file   Tobacco Use   • Smoking status: Never Smoker   Substance and Sexual Activity   • Alcohol use: Yes     Comment: socially   • Drug use: No   • Sexual activity: Defer       Family History:  Father  78, prostate cancer heart disease, history of 2 MIs  Mother  90, history of breast cancer, HTN, DM2  Sister: living 73 no known heart disease.       REVIEW OF SYSTEMS:   CONST:  No weight loss, fever, chills, weakness or fatigue.   HEENT:  No visual loss, blurred vision, double vision, yellow sclerae.                   No hearing loss, congestion, sore throat.   SKIN:      No rashes, urticaria, ulcers, sores.     RESP:     No shortness of breath, hemoptysis, cough, sputum.   GI:           No anorexia, nausea, vomiting, diarrhea. No abdominal pain, melena.   :         No burning on urination, hematuria or increased frequency.  ENDO:    No diaphoresis, cold or heat intolerance. No polyuria or polydipsia.   NEURO:  No headache, dizziness, syncope, paralysis, ataxia, or parasthesias.                  No change in bowel or bladder control. No history of CVA/TIA + history of pituitary tumor   MUSC:    No muscle, back pain, joint pain or stiffness.   HEME:    No anemia, bleeding, bruising. No history of DVT/PE.  PSYCH:  No history of depression, anxiety    Vitals:    19 0958   BP: 120/88   BP Location: Left arm   Patient Position: Sitting   Pulse: 63   Weight: (!) 148 kg (325 lb 6.4 oz)   Height: 203.2 cm (80\")                 Physical Exam:  GEN: Well nourished, well-developed, no acute distress  HEENT: Normocephalic, atraumatic, PERRLA, moist mucous membranes  NECK: Supple, NO JVD, no thyromegaly, no lymphadenopathy  CARD: S1S2, RRR, no murmur, gallop, rub, PMI NL  LUNGS: Clear to auscultation, normal respiratory effort  ABDOMEN: Soft, nontender, normal bowel " sounds  EXTREMITIES: No gross deformities, no clubbing, cyanosis, or edema  SKIN: Warm, dry, no lesions  NEURO: No focal deficits, alert and oriented x 3  PSYCHIATRIC: Normal affect and mood      I personally viewed and interpreted the patient's EKG/Telemetry/lab data    Lab Results   Component Value Date    GLUCOSE 182 (H) 07/21/2018    CALCIUM 9.7 07/21/2018     07/21/2018    K 4.8 07/21/2018    CO2 24.0 07/21/2018     07/21/2018    BUN 28 (H) 07/21/2018    CREATININE 1.22 07/21/2018    EGFRIFNONA 60 (L) 07/21/2018    BCR 23.0 07/21/2018    ANIONGAP 9.0 07/21/2018     Lab Results   Component Value Date    WBC 5.91 07/21/2018    HGB 13.6 07/21/2018    HCT 41.2 07/21/2018    MCV 95.4 07/21/2018     07/21/2018     No results found for: INR, PROTIME  No results found for: TSH, K0JZVJC, M3CBNLB, THYROIDAB          ECG 12 Lead  Date/Time: 7/24/2019 10:57 AM  Performed by: Gemma Rubio PA  Authorized by: Gemma Rubio PA   Rhythm: sinus rhythm  BPM: 63                ICD-10-CM ICD-9-CM   1. Paroxysmal atrial fibrillation (CMS/[facility]) I48.0 427.31   2. Essential hypertension I10 401.9   3. Coronary artery disease involving native coronary artery of native heart without angina pectoris I25.10 414.01       Assessment and Plan:   1. Paroxysmal Atrial Fibrillation:  - frequent symptomatic episodes despite taking Sotalol. Options for further treatment include other medications vs Pulmonary Vein Ablation. Both options were discussed with the patient and his wife today. The risks, benefits, and alternatives of the procedure have been reviewed and the patient wishes to proceed. He would like to proceed with PVA. Will place him the list today. He will need to stop Sotalol 4 days prior to the procedure.   - CHADSVasc = 3 continue Eliquis  - Of note, currently there is no documentation of his atrial fibrillation. Will try to obtain those records. If we cannot find them, will have patient to wear a monitor to  document his arrhythmia.     2. HTN:  - well controlled on current medications    3. CAD:  - previous LAD stent 2016  - no anginal type symptoms        Scribed for Jr Louis MD by Gemma Rubio PA-C. 7/24/2019  11:06 AM     I, Jr Louis MD, personally performed the services described in this documentation as scribed by the above named individual in my presence, and it is both accurate and complete.  7/24/2019  11:06 AM

## 2019-08-08 ENCOUNTER — PREP FOR SURGERY (OUTPATIENT)
Dept: OTHER | Facility: HOSPITAL | Age: 64
End: 2019-08-08

## 2019-08-08 DIAGNOSIS — I48.0 PAROXYSMAL ATRIAL FIBRILLATION (HCC): Primary | ICD-10-CM

## 2019-08-08 RX ORDER — SODIUM CHLORIDE 0.9 % (FLUSH) 0.9 %
1-10 SYRINGE (ML) INJECTION AS NEEDED
Status: CANCELLED | OUTPATIENT
Start: 2019-08-08

## 2019-08-08 RX ORDER — NITROGLYCERIN 0.4 MG/1
0.4 TABLET SUBLINGUAL
Status: CANCELLED | OUTPATIENT
Start: 2019-08-08

## 2019-08-08 RX ORDER — ACETAMINOPHEN 325 MG/1
650 TABLET ORAL EVERY 4 HOURS PRN
Status: CANCELLED | OUTPATIENT
Start: 2019-08-08

## 2019-08-08 RX ORDER — SODIUM CHLORIDE 9 MG/ML
1 INJECTION, SOLUTION INTRAVENOUS CONTINUOUS
Status: CANCELLED | OUTPATIENT
Start: 2019-08-08 | End: 2019-08-08

## 2019-08-08 RX ORDER — PROMETHAZINE HYDROCHLORIDE 25 MG/ML
12.5 INJECTION, SOLUTION INTRAMUSCULAR; INTRAVENOUS EVERY 4 HOURS PRN
Status: CANCELLED | OUTPATIENT
Start: 2019-08-08

## 2019-08-08 RX ORDER — SODIUM CHLORIDE 0.9 % (FLUSH) 0.9 %
3 SYRINGE (ML) INJECTION EVERY 12 HOURS SCHEDULED
Status: CANCELLED | OUTPATIENT
Start: 2019-08-08

## 2019-08-14 ENCOUNTER — TELEPHONE (OUTPATIENT)
Dept: CARDIOLOGY | Facility: CLINIC | Age: 64
End: 2019-08-14

## 2019-08-14 NOTE — TELEPHONE ENCOUNTER
I called [facility] and requested those EKGs showing A-fib. She should be faxing them to me soon.

## 2019-08-14 NOTE — TELEPHONE ENCOUNTER
Talked with patient today about results of his monitor. He did have symptoms of what he felt was his atrial fibrillation, however, the monitor showed SVT at 150 bpm. Patient states he was told he had atrial fibrillation by a physician in [place]. Will try to obtain those records from [id number] (approx). If no atrial fibrillation documentation obtained, will proceed with EPS +/- RFA SVT instead of PVA. Patient understands and wants to proceed with ablation.

## 2019-08-16 ENCOUNTER — DOCUMENTATION (OUTPATIENT)
Dept: CARDIOLOGY | Facility: CLINIC | Age: 64
End: 2019-08-16

## 2019-08-16 DIAGNOSIS — I47.1 PAROXYSMAL SVT (SUPRAVENTRICULAR TACHYCARDIA) (HCC): ICD-10-CM

## 2019-08-16 DIAGNOSIS — I48.0 PAROXYSMAL ATRIAL FIBRILLATION (HCC): Primary | ICD-10-CM

## 2019-08-16 NOTE — PROGRESS NOTES
Updated Problem List:  Despite efforts to obtain records from two different facilities, no atrial fibrillation documentation has been obtained. Patient recently wore ZioPatch which showed SVT, which correlated with his symptoms. Instead of PVA, will pursue EPS +/- RFA of SVT. Hold Sotalol 4 days prior to procedure. Stay on Eliquis for now, probably will stop after EPS, if no atrial fib/flutter found.     Problem List:  1. SVT/Tachycardia   a. CHADSVasc = 3 (DM2, CAD, HTN)  on Eliquis  b. Diagnosed with possible atrial fibrillation 2016 - no records able to be obtained from [facility] or [facility] demonstrating AFIB  c. Nuclear Stress Test 1/7/16: moderate in size/severity fixed apical defect with small fixed inferior defect, normal overall systolic function  d. 3/2018: Holter Monitor from outside hospital reported atrial fibrillation with RVR. HR  bpm, average 72 bpm (no strips for review)  e. Treated with Sotalol  f. Echocardiogram 9/25/18: EF 55-60%, LA moderately dilated. 3.8 cm. Mild MR  g. ZioPatch 7/2019 showed SVT at 150 bpm   2. CAD:  a. Nuclear Stress Test 1/7/16: moderate in size/severity fixed apical defect with small fixed inferior defect, normal overall systolic function  b. LHC with stent to LAD - data deficit - [place]  3. HTN  4. DM2  5. HLP  6. GERD  7. Peripheral neuropathy  8. Pituitary tumor - data deficit  9. SENTHIL - on bipap  10. Surgical History:  a. Right knee surgery  b. Kidney stone extraction

## 2019-08-20 ENCOUNTER — DOCUMENTATION (OUTPATIENT)
Dept: CARDIOLOGY | Facility: CLINIC | Age: 64
End: 2019-08-20

## 2019-08-20 NOTE — PROGRESS NOTES
Received results from patient's ER visit from [facility] in 2016 when he presented to new onset tachy-palpitations.  When I spoke to the patient over the phone, he reports this is when he got diagnosed with atrial fibrillation.  I reviewed all the records from his ER visit including telemetry strips.  Patient was in SVT at a rate of 168 bpm and did convert to sinus rhythm with 6 mg adenosine.  There is no mention of atrial fibrillation.  Will move forward with EP study +/- RFA SVT with Dr. Louis.  He is agreeable to plan.  Orders already in the computer.  Likely can stop Eliquis if no evidence of atrial fibrillation during EP study.  Updated problem list as below:    1. SVT  a. CHADSVasc = 3 (DM2, CAD, HTN)  on Eliquis for questionable diagnosis of atrial fibrillation  b. ER visit 5/12/2016 [facility] - presented with SVT at a rate of 168 bpm, converted with 6 mg adenosine  c. Echocardiogram 5/12/2016: EF 55%, concentric LVH, LA size 4.2 cm, no significant valvular disease  d. Nuclear Stress Test 1/7/16: moderate in size/severity fixed apical defect with small fixed inferior defect, normal overall systolic function  e. 3/2018: Holter Monitor from outside hospital reported atrial fibrillation with RVR. HR  bpm, average 72 bpm, strips available for review do not show any evidence of atrial fibrillation  f. Treated with Sotalol  g. Echocardiogram 9/25/18: EF 55-60%, LA moderately dilated. 3.8 cm. Mild MR  h. ZioPatch 7/2019 showed SVT at 150 bpm   2. CAD:  a. Nuclear Stress Test 1/7/16: moderate in size/severity fixed apical defect with small fixed inferior defect, normal overall systolic function  b. C with stent to LAD, 2016- data deficit - [place], [place]  3. HTN  4. DM2  5. HLP  6. GERD  7. Peripheral neuropathy  8. Pituitary tumor - data deficit  9. SENTHIL - on bipap  10. Surgical History:  a. Right knee surgery  b. Kidney stone extraction      Kyra  [facility]  8/20/2019  1:45 PM

## 2019-08-22 ENCOUNTER — HOSPITAL ENCOUNTER (OUTPATIENT)
Facility: HOSPITAL | Age: 64
Setting detail: HOSPITAL OUTPATIENT SURGERY
Discharge: HOME | End: 2019-08-23
Attending: INTERNAL MEDICINE | Admitting: INTERNAL MEDICINE

## 2019-08-22 DIAGNOSIS — I48.0 PAROXYSMAL ATRIAL FIBRILLATION (HCC): ICD-10-CM

## 2019-08-22 LAB
ANION GAP SERPL CALCULATED.3IONS-SCNC: 15 MMOL/L (ref 5–15)
BUN BLD-MCNC: 17 MG/DL (ref 8–23)
BUN/CREAT SERPL: 16 (ref 7–25)
CALCIUM SPEC-SCNC: 9.8 MG/DL (ref 8.6–10.5)
CHLORIDE SERPL-SCNC: 102 MMOL/L (ref 98–107)
CO2 SERPL-SCNC: 23 MMOL/L (ref 22–29)
CREAT BLD-MCNC: 1.06 MG/DL (ref 0.76–1.27)
DEPRECATED RDW RBC AUTO: 41.3 FL (ref 37–54)
ERYTHROCYTE [DISTWIDTH] IN BLOOD BY AUTOMATED COUNT: 11.9 % (ref 12.3–15.4)
GFR SERPL CREATININE-BSD FRML MDRD: 71 ML/MIN/1.73
GLUCOSE BLD-MCNC: 131 MG/DL (ref 65–99)
GLUCOSE BLDC GLUCOMTR-MCNC: 98 MG/DL (ref 70–130)
HCT VFR BLD AUTO: 42.6 % (ref 37.5–51)
HGB BLD-MCNC: 13.9 G/DL (ref 13–17.7)
INR PPP: 1.16 (ref 0.85–1.16)
MCH RBC QN AUTO: 30.9 PG (ref 26.6–33)
MCHC RBC AUTO-ENTMCNC: 32.6 G/DL (ref 31.5–35.7)
MCV RBC AUTO: 94.7 FL (ref 79–97)
PLATELET # BLD AUTO: 134 10*3/MM3 (ref 140–450)
PMV BLD AUTO: 11.1 FL (ref 6–12)
POTASSIUM BLD-SCNC: 4.5 MMOL/L (ref 3.5–5.2)
PROTHROMBIN TIME: 14.2 SECONDS (ref 11.2–14.3)
RBC # BLD AUTO: 4.5 10*6/MM3 (ref 4.14–5.8)
SODIUM BLD-SCNC: 140 MMOL/L (ref 136–145)
WBC NRBC COR # BLD: 4.05 10*3/MM3 (ref 3.4–10.8)

## 2019-08-22 PROCEDURE — 93621 COMP EP EVL L PAC&REC C SINS: CPT | Performed by: INTERNAL MEDICINE

## 2019-08-22 PROCEDURE — 94660 CPAP INITIATION&MGMT: CPT

## 2019-08-22 PROCEDURE — 36415 COLL VENOUS BLD VENIPUNCTURE: CPT

## 2019-08-22 PROCEDURE — 99152 MOD SED SAME PHYS/QHP 5/>YRS: CPT | Performed by: INTERNAL MEDICINE

## 2019-08-22 PROCEDURE — 25010000002 MIDAZOLAM PER 1 MG: Performed by: INTERNAL MEDICINE

## 2019-08-22 PROCEDURE — 94799 UNLISTED PULMONARY SVC/PX: CPT

## 2019-08-22 PROCEDURE — 82962 GLUCOSE BLOOD TEST: CPT

## 2019-08-22 PROCEDURE — 93653 COMPRE EP EVAL TX SVT: CPT | Performed by: INTERNAL MEDICINE

## 2019-08-22 PROCEDURE — 25010000002 FENTANYL CITRATE (PF) 100 MCG/2ML SOLUTION: Performed by: INTERNAL MEDICINE

## 2019-08-22 PROCEDURE — C1894 INTRO/SHEATH, NON-LASER: HCPCS | Performed by: INTERNAL MEDICINE

## 2019-08-22 PROCEDURE — 25010000003 LIDOCAINE 1 % SOLUTION: Performed by: INTERNAL MEDICINE

## 2019-08-22 PROCEDURE — 25010000002 ONDANSETRON PER 1 MG: Performed by: INTERNAL MEDICINE

## 2019-08-22 PROCEDURE — C1732 CATH, EP, DIAG/ABL, 3D/VECT: HCPCS | Performed by: INTERNAL MEDICINE

## 2019-08-22 PROCEDURE — 85027 COMPLETE CBC AUTOMATED: CPT

## 2019-08-22 PROCEDURE — 93623 PRGRMD STIMJ&PACG IV RX NFS: CPT | Performed by: INTERNAL MEDICINE

## 2019-08-22 PROCEDURE — C1730 CATH, EP, 19 OR FEW ELECT: HCPCS | Performed by: INTERNAL MEDICINE

## 2019-08-22 PROCEDURE — 85610 PROTHROMBIN TIME: CPT

## 2019-08-22 PROCEDURE — 93613 INTRACARDIAC EPHYS 3D MAPG: CPT | Performed by: INTERNAL MEDICINE

## 2019-08-22 PROCEDURE — 99153 MOD SED SAME PHYS/QHP EA: CPT | Performed by: INTERNAL MEDICINE

## 2019-08-22 PROCEDURE — 80048 BASIC METABOLIC PNL TOTAL CA: CPT

## 2019-08-22 RX ORDER — ROSUVASTATIN CALCIUM 20 MG/1
20 TABLET, COATED ORAL DAILY
Status: DISCONTINUED | OUTPATIENT
Start: 2019-08-22 | End: 2019-08-23 | Stop reason: HOSPADM

## 2019-08-22 RX ORDER — LIDOCAINE HYDROCHLORIDE 10 MG/ML
INJECTION, SOLUTION INFILTRATION; PERINEURAL AS NEEDED
Status: DISCONTINUED | OUTPATIENT
Start: 2019-08-22 | End: 2019-08-22 | Stop reason: HOSPADM

## 2019-08-22 RX ORDER — HYDROCODONE BITARTRATE AND ACETAMINOPHEN 5; 325 MG/1; MG/1
1 TABLET ORAL EVERY 4 HOURS PRN
Status: DISCONTINUED | OUTPATIENT
Start: 2019-08-22 | End: 2019-08-23 | Stop reason: HOSPADM

## 2019-08-22 RX ORDER — ASPIRIN 81 MG/1
81 TABLET ORAL DAILY
Status: DISCONTINUED | OUTPATIENT
Start: 2019-08-22 | End: 2019-08-23 | Stop reason: HOSPADM

## 2019-08-22 RX ORDER — SODIUM CHLORIDE 0.9 % (FLUSH) 0.9 %
3 SYRINGE (ML) INJECTION EVERY 12 HOURS SCHEDULED
Status: DISCONTINUED | OUTPATIENT
Start: 2019-08-22 | End: 2019-08-22 | Stop reason: HOSPADM

## 2019-08-22 RX ORDER — NITROGLYCERIN 0.4 MG/1
0.4 TABLET SUBLINGUAL
Status: DISCONTINUED | OUTPATIENT
Start: 2019-08-22 | End: 2019-08-22 | Stop reason: HOSPADM

## 2019-08-22 RX ORDER — CLONAZEPAM 0.5 MG/1
0.5 TABLET ORAL 3 TIMES DAILY PRN
Status: DISCONTINUED | OUTPATIENT
Start: 2019-08-22 | End: 2019-08-23 | Stop reason: HOSPADM

## 2019-08-22 RX ORDER — ESCITALOPRAM OXALATE 20 MG/1
20 TABLET ORAL DAILY
Status: DISCONTINUED | OUTPATIENT
Start: 2019-08-23 | End: 2019-08-23 | Stop reason: HOSPADM

## 2019-08-22 RX ORDER — LISINOPRIL 20 MG/1
20 TABLET ORAL DAILY
Status: DISCONTINUED | OUTPATIENT
Start: 2019-08-23 | End: 2019-08-23 | Stop reason: HOSPADM

## 2019-08-22 RX ORDER — SODIUM CHLORIDE 9 MG/ML
INJECTION, SOLUTION INTRAVENOUS CONTINUOUS PRN
Status: COMPLETED | OUTPATIENT
Start: 2019-08-22 | End: 2019-08-22

## 2019-08-22 RX ORDER — PROMETHAZINE HYDROCHLORIDE 25 MG/ML
12.5 INJECTION, SOLUTION INTRAMUSCULAR; INTRAVENOUS EVERY 4 HOURS PRN
Status: DISCONTINUED | OUTPATIENT
Start: 2019-08-22 | End: 2019-08-22 | Stop reason: HOSPADM

## 2019-08-22 RX ORDER — PANTOPRAZOLE SODIUM 40 MG/1
40 TABLET, DELAYED RELEASE ORAL 2 TIMES DAILY
Status: DISCONTINUED | OUTPATIENT
Start: 2019-08-22 | End: 2019-08-23 | Stop reason: HOSPADM

## 2019-08-22 RX ORDER — ACETAMINOPHEN 325 MG/1
650 TABLET ORAL EVERY 4 HOURS PRN
Status: DISCONTINUED | OUTPATIENT
Start: 2019-08-22 | End: 2019-08-22 | Stop reason: HOSPADM

## 2019-08-22 RX ORDER — ONDANSETRON 2 MG/ML
4 INJECTION INTRAMUSCULAR; INTRAVENOUS EVERY 6 HOURS PRN
Status: DISCONTINUED | OUTPATIENT
Start: 2019-08-22 | End: 2019-08-23 | Stop reason: HOSPADM

## 2019-08-22 RX ORDER — ONDANSETRON 2 MG/ML
INJECTION INTRAMUSCULAR; INTRAVENOUS AS NEEDED
Status: DISCONTINUED | OUTPATIENT
Start: 2019-08-22 | End: 2019-08-22 | Stop reason: HOSPADM

## 2019-08-22 RX ORDER — SODIUM CHLORIDE 0.9 % (FLUSH) 0.9 %
1-10 SYRINGE (ML) INJECTION AS NEEDED
Status: DISCONTINUED | OUTPATIENT
Start: 2019-08-22 | End: 2019-08-22 | Stop reason: HOSPADM

## 2019-08-22 RX ORDER — MIDAZOLAM HYDROCHLORIDE 1 MG/ML
INJECTION INTRAMUSCULAR; INTRAVENOUS AS NEEDED
Status: DISCONTINUED | OUTPATIENT
Start: 2019-08-22 | End: 2019-08-22 | Stop reason: HOSPADM

## 2019-08-22 RX ORDER — FENTANYL CITRATE 50 UG/ML
INJECTION, SOLUTION INTRAMUSCULAR; INTRAVENOUS AS NEEDED
Status: DISCONTINUED | OUTPATIENT
Start: 2019-08-22 | End: 2019-08-22 | Stop reason: HOSPADM

## 2019-08-22 RX ADMIN — METFORMIN HYDROCHLORIDE 1500 MG: 500 TABLET, FILM COATED ORAL at 21:03

## 2019-08-22 RX ADMIN — ASPIRIN 81 MG: 81 TABLET, COATED ORAL at 21:03

## 2019-08-22 RX ADMIN — PANTOPRAZOLE SODIUM 40 MG: 40 TABLET, DELAYED RELEASE ORAL at 21:03

## 2019-08-22 RX ADMIN — ROSUVASTATIN CALCIUM 20 MG: 20 TABLET, FILM COATED ORAL at 21:03

## 2019-08-22 RX ADMIN — CLONAZEPAM 0.5 MG: 0.5 TABLET ORAL at 22:03

## 2019-08-22 NOTE — INTERVAL H&P NOTE
"  H&P reviewed. The patient was examined and there are no changes to the H&P.   Patient with recurrent, symptomatic episodes of SVT.  He did require adenosine in the past to convert.  There was question of possible atrial fibrillation but we have had no documentation of atrial fibrillation, all episodes appear to be consistent with SVT.  He has held his Sotalol for the last 48 hours.  Will proceed today with EP study +/- RFA SVT with Dr. Louis.  If no evidence of atrial fibrillation during study, will discontinue Eliquis.  The risks, benefits, and alternatives of the procedure have been reviewed and the patient wishes to proceed.     Lab Results   Component Value Date    GLUCOSE 131 (H) 08/22/2019    CALCIUM 9.8 08/22/2019     08/22/2019    K 4.5 08/22/2019    CO2 23.0 08/22/2019     08/22/2019    BUN 17 08/22/2019    CREATININE 1.06 08/22/2019    EGFRIFNONA 71 08/22/2019    BCR 16.0 08/22/2019    ANIONGAP 15.0 08/22/2019     Lab Results   Component Value Date    WBC 4.05 08/22/2019    HGB 13.9 08/22/2019    HCT 42.6 08/22/2019    MCV 94.7 08/22/2019     (L) 08/22/2019     Lab Results   Component Value Date    INR 1.16 08/22/2019    PROTIME 14.2 08/22/2019       /81 (BP Location: Left arm, Patient Position: Lying)   Pulse 61   Resp 16   Ht 203.2 cm (80\")   Wt (!) 145 kg (320 lb)   SpO2 97%   BMI 35.15 kg/m²     Electronically signed by ADRIENNE Robbins, 08/22/19, 1:24 PM.        "

## 2019-08-23 VITALS
SYSTOLIC BLOOD PRESSURE: 123 MMHG | RESPIRATION RATE: 12 BRPM | WEIGHT: 315 LBS | OXYGEN SATURATION: 97 % | BODY MASS INDEX: 36.45 KG/M2 | HEIGHT: 78 IN | TEMPERATURE: 98.3 F | DIASTOLIC BLOOD PRESSURE: 88 MMHG | HEART RATE: 70 BPM

## 2019-08-23 PROBLEM — I48.0 PAROXYSMAL ATRIAL FIBRILLATION (HCC): Status: RESOLVED | Noted: 2019-07-24 | Resolved: 2019-08-23

## 2019-08-23 PROBLEM — I47.1 PAROXYSMAL SVT (SUPRAVENTRICULAR TACHYCARDIA) (HCC): Status: ACTIVE | Noted: 2019-08-23

## 2019-08-23 PROCEDURE — 94799 UNLISTED PULMONARY SVC/PX: CPT

## 2019-08-23 PROCEDURE — 99213 OFFICE O/P EST LOW 20 MIN: CPT | Performed by: NURSE PRACTITIONER

## 2019-08-23 PROCEDURE — 93005 ELECTROCARDIOGRAM TRACING: CPT | Performed by: INTERNAL MEDICINE

## 2019-08-23 PROCEDURE — 93010 ELECTROCARDIOGRAM REPORT: CPT | Performed by: INTERNAL MEDICINE

## 2019-08-23 PROCEDURE — 94660 CPAP INITIATION&MGMT: CPT

## 2019-08-23 RX ADMIN — ASPIRIN 81 MG: 81 TABLET, COATED ORAL at 09:15

## 2019-08-23 RX ADMIN — ESCITALOPRAM OXALATE 20 MG: 20 TABLET ORAL at 09:15

## 2019-08-23 RX ADMIN — LISINOPRIL 20 MG: 20 TABLET ORAL at 09:15

## 2019-08-23 RX ADMIN — ROSUVASTATIN CALCIUM 20 MG: 20 TABLET, FILM COATED ORAL at 09:15

## 2019-08-23 RX ADMIN — PANTOPRAZOLE SODIUM 40 MG: 40 TABLET, DELAYED RELEASE ORAL at 09:15

## 2019-08-23 NOTE — PLAN OF CARE
Problem: Patient Care Overview  Goal: Plan of Care Review  Outcome: Ongoing (interventions implemented as appropriate)   08/22/19 2200   Coping/Psychosocial   Plan of Care Reviewed With patient   Plan of Care Review   Progress improving   OTHER   Outcome Summary Patient denies any pain; No signs of bleeding; VSS; Will continue to monitor     Goal: Individualization and Mutuality  Outcome: Ongoing (interventions implemented as appropriate)    Goal: Discharge Needs Assessment  Outcome: Ongoing (interventions implemented as appropriate)    Goal: Interprofessional Rounds/Family Conf  Outcome: Ongoing (interventions implemented as appropriate)      Problem: Arrhythmia/Dysrhythmia (Symptomatic) (Adult)  Goal: Signs and Symptoms of Listed Potential Problems Will be Absent, Minimized or Managed (Arrhythmia/Dysrhythmia)  Outcome: Ongoing (interventions implemented as appropriate)      Problem: Cardiac Catheterization (Diagnostic/Interventional) (Adult)  Goal: Signs and Symptoms of Listed Potential Problems Will be Absent, Minimized or Managed (Cardiac Catheterization)  Outcome: Ongoing (interventions implemented as appropriate)    Goal: Anesthesia/Sedation Recovery  Outcome: Ongoing (interventions implemented as appropriate)

## 2019-08-23 NOTE — PROGRESS NOTES
"[facility]  Discharge Progress Note     LOS: 1 day   Patient Care Team:  Lizet Ayala DO as PCP - General (Family Medicine)    Chief Complaint:  SVT    Subjective     Interval History: Patient is s/p SVT ablation yesterday and has done well overnight.  Has been up ambulating without issue.  No c/o CP or SOB.  Feels ready to go home.         Review of Systems:   Pertinent positives in HPI, all others reviewed and negative.      Objective       Current Facility-Administered Medications:   •  aspirin EC tablet 81 mg, 81 mg, Oral, Daily, Kyra Gar APRN, 81 mg at 08/22/19 2103  •  clonazePAM (KlonoPIN) tablet 0.5 mg, 0.5 mg, Oral, TID PRN, Jr Louis MD, 0.5 mg at 08/22/19 2203  •  escitalopram (LEXAPRO) tablet 20 mg, 20 mg, Oral, Daily, Kyra Gar APRN  •  HYDROcodone-acetaminophen (NORCO) 5-325 MG per tablet 1 tablet, 1 tablet, Oral, Q4H PRN, Jr Louis MD  •  lisinopril (PRINIVIL,ZESTRIL) tablet 20 mg, 20 mg, Oral, Daily, Kyra Gar APRN  •  metFORMIN (GLUCOPHAGE) tablet 1,500 mg, 1,500 mg, Oral, Nightly, Kyra Gar APRN, 1,500 mg at 08/22/19 2103  •  ondansetron (ZOFRAN) injection 4 mg, 4 mg, Intravenous, Q6H PRN, Jr Louis MD  •  pantoprazole (PROTONIX) EC tablet 40 mg, 40 mg, Oral, BID, Kyra Gar APRN, 40 mg at 08/22/19 2103  •  rosuvastatin (CRESTOR) tablet 20 mg, 20 mg, Oral, Daily, Kyra Gar APRN, 20 mg at 08/22/19 2103    Vital Sign Min/Max for last 24 hours  Temp  Min: 98.3 °F (36.8 °C)  Max: 98.3 °F (36.8 °C)   BP  Min: 103/55  Max: 154/64   Pulse  Min: 54  Max: 68   Resp  Min: 10  Max: 18   SpO2  Min: 91 %  Max: 100 %   No Data Recorded   Weight  Min: 145 kg (320 lb)  Max: 145 kg (320 lb)     Flowsheet Rows      First Filed Value   Admission Height  203.2 cm (80\") Documented at 08/22/2019 1152   Admission Weight  145 kg (320 lb)  (Abnormal)  " Documented at 08/22/2019 1152          Physical Exam:     General Appearance:    Alert, cooperative, in no acute distress   Lungs:     Clear to auscultation, respirations regular, even and                  unlabored    Heart:    Regular rhythm and normal rate, normal S1 and S2, no            murmur, no gallop, no rub, no click   Chest Wall:    No abnormalities observed   Abdomen:     Normal bowel sounds, no masses, soft, non-tender, non-distended   Extremities:   Moves all extremities well, no edema, no cyanosis, no             redness   Pulses:   Pulses palpable and equal bilaterally   Skin:   Groin and neck sites are clean, dry and intact. No bleeding, bruising or hematoma.         Results Review:   Results from last 7 days   Lab Units 08/22/19  1218   WBC 10*3/mm3 4.05   HEMOGLOBIN g/dL 13.9   HEMATOCRIT % 42.6   PLATELETS 10*3/mm3 134*     Results from last 7 days   Lab Units 08/22/19  1218   SODIUM mmol/L 140   POTASSIUM mmol/L 4.5   CHLORIDE mmol/L 102   CO2 mmol/L 23.0   BUN mg/dL 17   CREATININE mg/dL 1.06   GLUCOSE mg/dL 131*                      Results from last 7 days   Lab Units 08/22/19  1218   PROTIME Seconds 14.2   INR  1.16           Intake/Output Summary (Last 24 hours) at 8/23/2019 0854  Last data filed at 8/22/2019 1500  Gross per 24 hour   Intake --   Output 275 ml   Net -275 ml       I personally viewed and interpreted the patient's EKG/Telemetry data    EKG: Sinus bradycardia, HR 53 bpm    Telemetry: NSR, HR 54-68 bpm      Present on Admission:  • Paroxysmal SVT (supraventricular tachycardia) (CMS/[facility])  • Essential hypertension    Assessment/Plan   1. 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 Pt has done well over night. Medications, wound care and follow have been reviewed with the patient.   - No inducible AF.  Stop Eliquis and Sotalol.    2. HTN:  - Well controlled, continue Lisinopril    Plan: The patient is stable and  will be discharged to home today with plan for follow up with Dr. Louis in 3 months.    Electronically signed by ADRIENNE Robbins, 08/23/19, 7:34 AM.

## 2019-08-30 ENCOUNTER — APPOINTMENT (OUTPATIENT)
Dept: PREADMISSION TESTING | Facility: HOSPITAL | Age: 64
End: 2019-08-30

## 2019-08-30 ENCOUNTER — APPOINTMENT (OUTPATIENT)
Dept: CT IMAGING | Facility: HOSPITAL | Age: 64
End: 2019-08-30

## 2019-12-18 ENCOUNTER — OFFICE VISIT (OUTPATIENT)
Dept: CARDIOLOGY | Facility: CLINIC | Age: 64
End: 2019-12-18

## 2019-12-18 VITALS
SYSTOLIC BLOOD PRESSURE: 130 MMHG | OXYGEN SATURATION: 98 % | BODY MASS INDEX: 36.45 KG/M2 | HEIGHT: 78 IN | WEIGHT: 315 LBS | DIASTOLIC BLOOD PRESSURE: 78 MMHG | HEART RATE: 87 BPM

## 2019-12-18 DIAGNOSIS — I10 ESSENTIAL HYPERTENSION: ICD-10-CM

## 2019-12-18 DIAGNOSIS — I47.1 PAROXYSMAL SVT (SUPRAVENTRICULAR TACHYCARDIA) (HCC): Primary | ICD-10-CM

## 2019-12-18 PROCEDURE — 99213 OFFICE O/P EST LOW 20 MIN: CPT | Performed by: INTERNAL MEDICINE

## 2019-12-18 PROCEDURE — 93000 ELECTROCARDIOGRAM COMPLETE: CPT | Performed by: INTERNAL MEDICINE

## 2019-12-18 RX ORDER — FLUOXETINE HYDROCHLORIDE 40 MG/1
CAPSULE ORAL
Refills: 0 | COMMUNITY
Start: 2019-11-25 | End: 2020-11-05

## 2019-12-18 NOTE — PROGRESS NOTES
Raoul Witt Pardeep  1955  [phone]    12/18/2019    [facility] CARDIOLOGY     Artem Morgan MD  [address] [place] [id number]    Chief Complaint   Patient presents with   • Paroxysmal SVT (supraventricular tachycardia)       Problem List:   1. SVT  a. CHADSVasc = 3 (DM2, CAD, HTN)  on Eliquis for questionable diagnosis of atrial fibrillation  b. ER visit 5/12/2016 [facility] - presented with SVT at a rate of 168 bpm, converted with 6 mg adenosine  c. Echocardiogram 5/12/2016: EF 55%, concentric LVH, LA size 4.2 cm, no significant valvular disease  d. Nuclear Stress Test 1/7/16: moderate in size/severity fixed apical defect with small fixed inferior defect, normal overall systolic function  e. 3/2018: Holter Monitor from outside hospital reported atrial fibrillation with RVR. HR  bpm, average 72 bpm, strips available for review do not show any evidence of atrial fibrillation  f. Treated with Sotalol  g. Echocardiogram 9/25/18: EF 55-60%, LA moderately dilated. 3.8 cm. Mild MR  h. ZioPatch 7/2019 showed SVT at 150 bpm  i. 8/22/19 EPS with RFA of AVnRT   2. CAD:  a. Nuclear Stress Test 1/7/16: moderate in size/severity fixed apical defect with small fixed inferior defect, normal overall systolic function  b. LHC with stent to LAD, 2016- data deficit - [place]  3. HTN  4. DM2  5. HLP  6. GERD  7. Peripheral neuropathy  8. Pituitary tumor - data deficit  9. SENTHIL - on bipap  10. Surgical History:  a. Right knee surgery  b. Kidney stone extraction      Allergies  No Known Allergies    Current Medications    Current Outpatient Medications:   •  aspirin 81 MG EC tablet, Take 81 mg by mouth Daily., Disp: , Rfl:   •  FERROUS SULFATE PO, Take 27 mg by mouth Daily., Disp: , Rfl:   •  FLUoxetine (PROzac) 40 MG capsule, TAKE ONE CAPSULE BY MOUTH EVERY EVENING BEFORE BEDTIME X 30 DAYS., Disp: , Rfl: 0  •  lisinopril (PRINIVIL,ZESTRIL) 20 MG tablet,  "Take 20 mg by mouth Daily., Disp: , Rfl:   •  magnesium oxide (MAG-OX) 400 MG tablet, Take 400 mg by mouth Daily., Disp: , Rfl:   •  metFORMIN (GLUCOPHAGE) 500 MG tablet, Take 1,500 mg by mouth Every Night., Disp: , Rfl:   •  Omega-3 Fatty Acids (FISH OIL) 1000 MG capsule capsule, Take 1,000 mg by mouth Every Night., Disp: , Rfl:   •  pantoprazole (PROTONIX) 40 MG EC tablet, Take 1 tablet by mouth 2 (Two) Times a Day., Disp: 180 tablet, Rfl: 3  •  rosuvastatin (CRESTOR) 20 MG tablet, Take 20 mg by mouth Daily., Disp: , Rfl:     History of Present Illness     Pt presents for follow up of SVT/HTN/CAD. Since we last saw the pt, pt denies any palps, SOB, CP, LH, and dizziness. Denies any hospitalizations, ER visits, bleeding, or TIA/CVA symptoms. Overall feels well. BP well controllled    ROS:  General:  Denies fatigue, weight gain or loss  Cardiovascular:  Denies CP, PND, syncope, near syncope, edema or palpitations.  Pulmonary:  Denies ALMANZAR, cough, or wheezing      Vitals:    12/18/19 1517   BP: 130/78   BP Location: Right arm   Patient Position: Sitting   Pulse: 87   SpO2: 98%   Weight: (!) 147 kg (324 lb)   Height: 203.2 cm (80\")     Body mass index is 35.59 kg/m².  PE:  General: NAD  Neck: no JVD, no carotid bruits, no TM  Heart RRR, NL S1, S2, S4 present, no rubs, murmurs  Lungs: CTA, no wheezes, rhonchi, or rales  Abd: soft, non-tender, NL BS  Ext: No musculoskeletal deformities, no edema, cyanosis, or clubbing  Psych: normal mood and affect    Diagnostic Data:        ECG 12 Lead  Date/Time: 12/18/2019 3:35 PM  Performed by: Jr Louis MD  Authorized by: Jr oLuis MD   Comparison: compared with previous ECG from 7/24/2019  Similar to previous ECG  Rhythm: sinus rhythm  BPM: 80              1. Paroxysmal SVT (supraventricular tachycardia) (CMS/HCC)    2. Essential hypertension          Plan:  1) SVT/AVnRT s/p RFA: No recurrence  Continue present medications.   2) HTN  Wt loss, exercise, salt " reduction  3) CAD s/p PTCA/LAD: on ASA    F/up prn

## 2020-09-10 ENCOUNTER — OFFICE VISIT (OUTPATIENT)
Dept: ORTHOPEDIC SURGERY | Facility: CLINIC | Age: 65
End: 2020-09-10

## 2020-09-10 DIAGNOSIS — M75.02 ADHESIVE CAPSULITIS OF LEFT SHOULDER: ICD-10-CM

## 2020-09-10 DIAGNOSIS — S46.012A TRAUMATIC COMPLETE TEAR OF LEFT ROTATOR CUFF, INITIAL ENCOUNTER: ICD-10-CM

## 2020-09-10 DIAGNOSIS — S49.92XA INJURY OF LEFT SHOULDER, INITIAL ENCOUNTER: Primary | ICD-10-CM

## 2020-09-10 PROCEDURE — 99203 OFFICE O/P NEW LOW 30 MIN: CPT | Performed by: ORTHOPAEDIC SURGERY

## 2020-09-10 RX ORDER — BUPROPION HYDROCHLORIDE 150 MG/1
150 TABLET, EXTENDED RELEASE ORAL 2 TIMES DAILY
COMMUNITY
End: 2021-01-15

## 2020-09-10 RX ORDER — DOXEPIN HYDROCHLORIDE 10 MG/1
10 CAPSULE ORAL DAILY
COMMUNITY

## 2020-09-10 RX ORDER — QUETIAPINE FUMARATE 200 MG/1
200 TABLET, FILM COATED ORAL DAILY
COMMUNITY

## 2020-09-10 NOTE — PROGRESS NOTES
[facility] Orthopaedic Surgery Clinic Note        Subjective     Pain of the Left Shoulder      HPI      Raoul Roberto is a 64 y.o. male who presents with new problem of: left shoulder pain.  Onset: mechanical fall x3 May 2020-August 2020. The issue has been ongoing for 4 month(s). Pain is a 9/10 on the pain scale. Pain is described as dull, aching, burning, throbbing, stabbing and shooting. Associated symptoms include pain. The pain is worse with sleeping, lying on affected side and any movement of the joint; resting improve the pain. Previous treatments have included: nothing.    I have reviewed the following portions of the patient's history:History of Present Illnessand review of systems.      Patient seeing me today for new problem regarding his left shoulder.  In 2013, we fixed his right rotator cuff and he did very well after that procedure.  In May 2020, the patient began a series of falls totaling 3.  Since that time, he said difficulty raising the shoulder.  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  At one in [facility], he sustained another injury that sent him to the ER.  He is here for an evaluation to see what can be done to fix his left shoulder.      Past Medical History:   Diagnosis Date   • Arthritis    • Atrial fibrillation (CMS/HCC)    • Diabetes mellitus (CMS/HCC)    • Hyperlipidemia    • Hypertension    • Kidney stone    • Seasonal allergies       Past Surgical History:   Procedure Laterality Date   • CARDIAC ELECTROPHYSIOLOGY PROCEDURE N/A 8/22/2019    Procedure: EPS+/-RFA. Stop Sotalol 4 days prior. DNS meds.;  Surgeon: Jr Louis MD;  Location:  [facility] INVAS[facility] LOCATION;  Service: Cardiology   • COLONOSCOPY N/A 10/2/2018    Procedure: COLONOSCOPY;  Surgeon: Guille Addison MD;  Location: [facility] ENDOSCOPY;  Service: Gastroenterology   • CORONARY ANGIOPLASTY WITH STENT PLACEMENT     • ENDOSCOPY N/A 10/2/2018     Procedure: ESOPHAGOGASTRODUODENOSCOPY;  Surgeon: Guille Addisno MD;  Location: [facility] ENDOSCOPY;  Service: Gastroenterology   • KNEE SURGERY     • ROTATOR CUFF REPAIR Right       Family History   Problem Relation Age of Onset   • Diabetes Mother    • Breast cancer Mother    • Cancer Mother    • Osteoarthritis Mother    • Hypertension Mother    • Heart attack Father         3 times   • Prostate cancer Father    • Aneurysm Father         abdomen   • Cancer Father    • Hypertension Father      Social History     Socioeconomic History   • Marital status:      Spouse name: Not on file   • Number of children: Not on file   • Years of education: Not on file   • Highest education level: Not on file   Tobacco Use   • Smoking status: Never Smoker   • Smokeless tobacco: Never Used   Substance and Sexual Activity   • Alcohol use: Yes     Comment: rare   • Drug use: No   • Sexual activity: Defer      Current Outpatient Medications on File Prior to Visit   Medication Sig Dispense Refill   • aspirin 81 MG EC tablet Take 81 mg by mouth Daily.     • buPROPion (ZYBAN) 150 MG 12 hr tablet Take 150 mg by mouth Daily.     • doxepin (SINEquan) 10 MG capsule Take 10 mg by mouth Daily.     • FLUoxetine (PROzac) 40 MG capsule TAKE ONE CAPSULE BY MOUTH EVERY EVENING BEFORE BEDTIME X 30 DAYS.  0   • lisinopril (PRINIVIL,ZESTRIL) 20 MG tablet Take 20 mg by mouth Daily.     • metFORMIN (GLUCOPHAGE) 500 MG tablet Take 1,500 mg by mouth Every Night.     • Omega-3 Fatty Acids (FISH OIL) 1000 MG capsule capsule Take 1,000 mg by mouth Every Night.     • pantoprazole (PROTONIX) 40 MG EC tablet Take 1 tablet by mouth 2 (Two) Times a Day. 180 tablet 3   • QUEtiapine (SEROquel) 50 MG tablet Take 50 mg by mouth Daily.     • [DISCONTINUED] FERROUS SULFATE PO Take 27 mg by mouth Daily.     • [DISCONTINUED] magnesium oxide (MAG-OX) 400 MG tablet Take 400 mg by mouth Daily.     • [DISCONTINUED] rosuvastatin (CRESTOR) 20 MG tablet Take 20 mg by mouth  Daily.       No current facility-administered medications on file prior to visit.       No Known Allergies       Review of Systems   Constitutional: Negative.    HENT: Negative.    Eyes: Negative.    Respiratory: Negative.    Cardiovascular: Negative.    Gastrointestinal: Negative.    Endocrine: Negative.    Genitourinary: Negative.    Musculoskeletal: Positive for arthralgias and back pain.   Skin: Negative.    Allergic/Immunologic: Negative.    Neurological: Negative.    Hematological: Negative.    Psychiatric/Behavioral: Positive for agitation and behavioral problems. The patient is nervous/anxious.         I reviewed the patient's chief complaint, history of present illness, review of systems, past medical history, surgical history, family history, social history, medications and allergy list.        Objective      Physical Exam  There were no vitals taken for this visit.    There is no height or weight on file to calculate BMI.    General  Mental Status - alert  General Appearance - cooperative, well groomed, not in acute distress  Orientation - Oriented X3  Build & Nutrition - well developed and well nourished  Posture - normal posture  Gait - normal gait     Integumentary  Global Assessment  Examination of related systems reveals - no lymphadenopathy  Ears:  No abnormality  Nose:  No mucous drainage  General Characteristics  Overall examination of the patient's skin reveals - no rashes, no evidence of scars, no suspicious lesions and no bruises.  Color - normal coloration of skin.  Vascular: Brisk capillary refill in all extremities    Ortho Exam  Musculoskeletal   Upper Extremity   Left Shoulder     Inspection and Palpation:     Medial border scapular tenderness-none    AC Joint Tenderness -minimal    Sensation is normal    Examination reveals no ecchymosis.        Strength and Tone:    Supraspinatus -3 out of 5    External Vsbabhev-3-5    Infraspinatus - 5/5    Subscapularis -4-5    Deltoid - 5/5     Range  of Motion      Left Shoulder:    Internal Rotation: ROM -L5-S1    External Rotation: AROM -40 degrees    Elevation through flexion: AROM -80 degrees     Abduction: Less than 90 degrees       Impingement   Left shoulder    Castellanos-Obdulio impingement test positive    Neer impingement test positive     Functional Testing   Left shoulder    AC crossover adduction test positive      Imaging/Studies  Imaging Results (Last 24 Hours)     ** No results found for the last 24 hours. **      We have reviewed images and report of an MRI the patient's left shoulder from a facility in [facility].  Patient appears to have mild degenerative changes at the AC joint and glenohumeral joint.  He does also appear to have a full-thickness tear of the supraspinatus, infraspinatus, and subscapularis.  There is proximal migration of the humeral head.      Assessment    Assessment:  1. Injury of left shoulder, initial encounter    2. Traumatic complete tear of left rotator cuff, initial encounter    3. Adhesive capsulitis of left shoulder        Plan:  1. Continue over-the-counter medication as needed for discomfort  2. Left shoulder pain in the face of massive 3 tendon rotator cuff tear that is retracted with proximal migration and resultant adhesive capsulitis--we had a lengthy discussion with the patient regarding treatment options and alternatives.  At this point, I have recommended that he go to physical therapy to restore his passive range of motion.  He may get some function back if we get his passive range of motion back.  I do not believe his rotator cuff tear is repairable and we have shared this with Dr. Alves as well and he concurs.  Plan on physical therapy at [facility] with Solo Roberson.  See him back in a month.  Consider him UA if needed.  We told him that reverse TSA is likely his long-term best bet if surgery is needed.        Miko Pelaez MD  09/10/20  14:22    Dragon disclaimer:  Much of this encounter  note is an electronic transcription/translation of spoken language to printed text. The electronic translation of spoken language may permit erroneous, or at times, nonsensical words or phrases to be inadvertently transcribed; Although I have reviewed the note for such errors, some may still exist.

## 2020-09-11 ENCOUNTER — TELEPHONE (OUTPATIENT)
Dept: ORTHOPEDIC SURGERY | Facility: CLINIC | Age: 65
End: 2020-09-11

## 2020-09-11 NOTE — TELEPHONE ENCOUNTER
He can be brought in to discuss this with Dr. Alves sometime in the next few weeks.  He still needs to go to physical therapy to restore his passive range of motion which is not very good currently.    Thank you    PATRICA

## 2020-09-11 NOTE — TELEPHONE ENCOUNTER
PATIENT CALLED AND WANTS TO KNOW IF DR. HOLMAN CAN JUST DO SURGERY INSTEAD OF HIM GOING TO PHYSICAL THERAPY? PATIENT CAN BE REACHED @ [phone]

## 2020-09-11 NOTE — TELEPHONE ENCOUNTER
Dr. Pelaez-please see message below and advise. Thanks!    Spoke with pt regarding previous message; He states that he understood that his surgical option is a reverse TSA and he would like to go ahead and pursue that instead of PT, since PT may or may not help his pain.    Navjot

## 2020-09-14 NOTE — TELEPHONE ENCOUNTER
PATIENT WAS NOTIFIED TO CONTINUE PHYSICAL THERAPY.  PATIENT'S WIFE CALLED BACK AND DANISHA NOTIFIED HER AS WELL.

## 2020-09-16 ENCOUNTER — TELEPHONE (OUTPATIENT)
Dept: ORTHOPEDIC SURGERY | Facility: CLINIC | Age: 65
End: 2020-09-16

## 2020-09-16 NOTE — TELEPHONE ENCOUNTER
Remind me to talk to [facility] about this rakesh tomorrow please.  I don't want to spring this on him.    PATRICA

## 2020-09-16 NOTE — TELEPHONE ENCOUNTER
Please call them and tell them that we need them to help him regain full passive ROM prior to any surgery if possible.    Maryam BURCIAGA

## 2020-09-16 NOTE — TELEPHONE ENCOUNTER
PATIENT WOULD LIKE TO BYPASS PHYSICAL THERAPY DUE TO THE PAIN AND COST. PATIENT WOULD LIKE TO PROCEED WITH SCHEDULING SURGERY. PLEASE ADVISE.

## 2020-09-16 NOTE — TELEPHONE ENCOUNTER
PATIENT WENT TO PHYSICAL THERAPY TODAY AND WAS CONCERNED WITH WHAT HE WAS NEEDING TO BE DOING. I ADVISED THE THERAPIST WITH THE PREVIOUS MESSAGES WITHIN THE PATIENT'S CHART, HOWEVER, THERE IS STILL SOME CONFUSION IN REGARDS TO HIS VISITS AND HOW LONG HE NEEDS TO BE DOING THERAPY. PATIENT HAS A HIGH COPAY AND HE IS CONCERNED WITH THIS ALSO. ROBERTO CAN BE REACHED -[phone]

## 2020-09-22 ENCOUNTER — OFFICE VISIT (OUTPATIENT)
Dept: ORTHOPEDIC SURGERY | Facility: CLINIC | Age: 65
End: 2020-09-22

## 2020-09-22 VITALS — HEART RATE: 105 BPM | WEIGHT: 315 LBS | BODY MASS INDEX: 36.45 KG/M2 | OXYGEN SATURATION: 98 % | HEIGHT: 78 IN

## 2020-09-22 DIAGNOSIS — M75.122 NONTRAUMATIC COMPLETE TEAR OF LEFT ROTATOR CUFF: Primary | ICD-10-CM

## 2020-09-22 DIAGNOSIS — M24.512 CONTRACTURE OF JOINT OF LEFT SHOULDER REGION: ICD-10-CM

## 2020-09-22 DIAGNOSIS — M75.42 IMPINGEMENT SYNDROME OF LEFT SHOULDER: ICD-10-CM

## 2020-09-22 DIAGNOSIS — M75.52 BURSITIS OF LEFT SHOULDER: ICD-10-CM

## 2020-09-22 PROCEDURE — 99214 OFFICE O/P EST MOD 30 MIN: CPT | Performed by: ORTHOPAEDIC SURGERY

## 2020-09-22 NOTE — PROGRESS NOTES
[facility] [place] [facility] Surgery Office Visit - Eduar Alves MD    Office Visit       Patient Name: Raoul Roberto    Chief Complaint:   Chief Complaint   Patient presents with   • Left Shoulder - Pain       Referring Physician: Dr. Pelaez    History of Present Illness:   Raoul Roberto is a 64 y.o. male who presents with left body part: shoulder Reason: pain.  Onset:Onset: atraumatic and gradual in nature. The issue has been ongoing for 5 month(s). Pain is a 10/10 on the pain scale. Pain is described as Pain Characterization: throbbing and shooting. Associated symptoms include Symptoms: pain and stiffness. The pain is worse with sleeping and working; resting improve the pain. Previous treatments have included: nothing. I have reviewed the patient's history of present illness as noted/entered above.    I have reviewed the patient's past medical history, surgical history, social history, family history, medications, and allergies as noted in the electronic medical record and as noted/entered.  I have reviewed the patient's review of systems as noted/enter and updated as noted in the patient's HPI.      LEFT shoulder massive rotator cuff tears    Prior right rcr with Dr. Pelaez and has done well.    LEFT - multiple falls -- at least x3      Subjective   Subjective      Review of Systems   Constitutional: Negative.  Negative for chills, fatigue and fever.   HENT: Negative.  Negative for congestion and dental problem.    Eyes: Negative.  Negative for blurred vision.   Respiratory: Negative.  Negative for shortness of breath.    Cardiovascular: Negative.  Negative for leg swelling.   Gastrointestinal: Negative.  Negative for abdominal pain.   Endocrine: Negative.  Negative for polyuria.   Genitourinary: Negative.  Negative for difficulty urinating.   Musculoskeletal: Positive for arthralgias.   Skin: Negative.    Allergic/Immunologic:  Negative.    Neurological: Negative.    Hematological: Negative.  Negative for adenopathy.   Psychiatric/Behavioral: Negative.  Negative for behavioral problems.        Past Medical History:   Past Medical History:   Diagnosis Date   • Arthritis    • Atrial fibrillation (CMS/HCC)    • Diabetes mellitus (CMS/HCC)    • Hyperlipidemia    • Hypertension    • Kidney stone    • Seasonal allergies        Past Surgical History:   Past Surgical History:   Procedure Laterality Date   • CARDIAC ELECTROPHYSIOLOGY PROCEDURE N/A 8/22/2019    Procedure: EPS+/-RFA. Stop Sotalol 4 days prior. DNS meds.;  Surgeon: Jr Louis MD;  Location:  [facility];  Service: Cardiology   • COLONOSCOPY N/A 10/2/2018    Procedure: COLONOSCOPY;  Surgeon: Guille Addison MD;  Location:  [facility];  Service: Gastroenterology   • CORONARY ANGIOPLASTY WITH STENT PLACEMENT     • ENDOSCOPY N/A 10/2/2018    Procedure: ESOPHAGOGASTRODUODENOSCOPY;  Surgeon: Guille Addison MD;  Location:  [facility];  Service: Gastroenterology   • KNEE SURGERY     • ROTATOR CUFF REPAIR Right        Family History:   Family History   Problem Relation Age of Onset   • Diabetes Mother    • Breast cancer Mother    • Cancer Mother    • Osteoarthritis Mother    • Hypertension Mother    • Heart attack Father         3 times   • Prostate cancer Father    • Aneurysm Father         abdomen   • Cancer Father    • Hypertension Father        Social History:   Social History     Socioeconomic History   • Marital status:      Spouse name: Not on file   • Number of children: Not on file   • Years of education: Not on file   • Highest education level: Not on file   Tobacco Use   • Smoking status: Never Smoker   • Smokeless tobacco: Never Used   Substance and Sexual Activity   • Alcohol use: Yes     Comment: rare   • Drug use: No   • Sexual activity: Defer       Medications:   Current Outpatient Medications:   •  aspirin 81 MG EC tablet, Take 81 mg  "by mouth Daily., Disp: , Rfl:   •  buPROPion (ZYBAN) 150 MG 12 hr tablet, Take 150 mg by mouth Daily., Disp: , Rfl:   •  doxepin (SINEquan) 10 MG capsule, Take 10 mg by mouth Daily., Disp: , Rfl:   •  FLUoxetine (PROzac) 40 MG capsule, TAKE ONE CAPSULE BY MOUTH EVERY EVENING BEFORE BEDTIME X 30 DAYS., Disp: , Rfl: 0  •  lisinopril (PRINIVIL,ZESTRIL) 20 MG tablet, Take 20 mg by mouth Daily., Disp: , Rfl:   •  metFORMIN (GLUCOPHAGE) 500 MG tablet, Take 1,500 mg by mouth Every Night., Disp: , Rfl:   •  Omega-3 Fatty Acids (FISH OIL) 1000 MG capsule capsule, Take 1,000 mg by mouth Every Night., Disp: , Rfl:   •  pantoprazole (PROTONIX) 40 MG EC tablet, Take 1 tablet by mouth 2 (Two) Times a Day., Disp: 180 tablet, Rfl: 3  •  QUEtiapine (SEROquel) 50 MG tablet, Take 50 mg by mouth Daily., Disp: , Rfl:     Allergies: No Known Allergies    The following portions of the patient's history were reviewed and updated as appropriate: allergies, current medications, past family history, past medical history, past social history, past surgical history and problem list.        Objective    Objective      Vital Signs:   Vitals:    09/22/20 1011   Pulse: 105   SpO2: 98%   Weight: (!) 147 kg (324 lb 1.2 oz)   Height: 203.2 cm (80\")       Ortho Exam:  General: no acute distress, comfortable  Vitals reviewed in chart  Head: normocephalic, atraumatic  Ears: no external drainage noted  Eyes: extraocular muscles appear intact with good tracking  Psych: alert and oriented, normal appearing mood  Vascular: 2+ pulses symmetric, well perfused  Neurologic:  Sensation to light touch intact distally  Spine/Cervical exam: motion preserved  Dermatologic: skin not hot/red/swollen  Respiratory: breathing comfortably on room air, no intercostal retractions  Cardiovascular: regular rate and rhythm, well perfused  Mask in place  Severely restricted left shoulder motion and weakness    Musculoskeletal Exam    SIDE: LEFT  Shoulder Exam    Tenderness: " Rotator cuff    Range of motion measurements (degrees)  Forward flexion/Abduction/External rotation at side/ER at 90/IR at 90/IR position  Active: 60/60/10  Passive: Limited due to contracture 90/90/35/60/40    Painful arc of motion: yes  Glenohumeral joint contracture: yes  Glenohumeral joint crepitus: yes  Glenohumeral joint pain: yes  No evidence of septic joint  Impingement testing Neer's test - positive/painful  Impingement testing Hawkin's test - positive/painful    Rotator Cuff Testing:  Tenderness to palpation at rotator cuff - yes  Rotator cuff testing Reshma's test - positive  Rotator cuff testing External rotation - positive  Rotator cuff testing Lag signs - positive  Rotator cuff testing Belly press - positive  Pain with abduction great than 90 degrees - yes  Rotator cuff testing limiting by glenohumeral joint pain and stiffness    Scapular dyskinesis - present, abnormal scapular motion    Long head of the biceps testing:  Sauceda's test for biceps - positive  Bicipital groove tenderness to palpation - positive    Chronic biceps tear    Results Review:   Imaging Results (Last 24 Hours)     ** No results found for the last 24 hours. **        I personally reviewed the left shoulder MRI completed at outside facility on 8/22/2020 [facility]    Hematoma noted  Massive retracted chronic tears of the supraspinatus infraspinatus and subscapularis with superior humeral head migration  Fatty infiltration and atrophy of the supra and infraspinatus  All 3 tendons are significantly retracted  Chronic biceps tear  Degenerative changes noted    Procedures             Assessment / Plan      Assessment/Plan:   Problem List Items Addressed This Visit     None      Visit Diagnoses     Nontraumatic complete tear of left rotator cuff    -  Primary    Impingement syndrome of left shoulder        Bursitis of left shoulder              LEFT SHOULDER    Risks and benefits of continued nonoperative management versus surgical  "management were discussed. The patient desires to proceed with operative intervention.    Rotator cuff repair was discussed with subacromial decompression with acromioplasty. We discussed that sometimes the rotator cuff tear is not repairable and may require debridement or partial repair. The procedure is routinely done arthroscopically, but sometimes a larger incision needs to be made to complete the repair in an \"open\" fashion for rare cases.    Specific risks discussed included pain, bleeding, infection, injury to surrounding nerve and blood vessels, fracture, failure or lack of healing of rotator cuff repair, incomplete pain relief, hardware failure, potential need for additional procedures, stiffness after surgery, and potential inability to restore range of motion and strength. Medical, anesthetic, and block complications were additionally discussed.     General anesthesia is required, sling compliance, and compliance with physical therapy and/or a surgeon guided exercise program will be very important to the recovery process.    We also discussed the risks and benefits of postoperative medications including potential opioid medications for pain control. We discussed the need for compliance with SARAH and the opioid pain medications and proper liberation from these medicines as soon as possible following surgery.    The patient understands that the surgery is elective surgery.  I discussed with the patient the risks and benefits of proceeding with surgery vs delaying surgery during the COVID-19 pandemic. The risks we discussed included but were not limited to the risk of zuleika a severe COVID-19 infection due to exposure within the hospital or during convalescence at home that may result in permanent injury or death.  The patient voiced understanding and gave their consent to proceed.  The patient desires to proceed.     Fitted for necessary neutral rotation sling -the sling will be necessary for " recovery and was provided today.    Prior stent  A. fib but status post ablation.  Does not require anticoagulation    Had a long discussion with the patient regarding his options.  Nonoperative measures, scope, reverse shoulder replacement.  I still feel that he is yet on the young side and does not have glenohumeral joint arthritis so holding off on reverse at this time.    We discussed that a scope may be just debridement alone, partial repair, or complete repair.  He understands that we would still wait 3 to 6 months before even considering transition to reverse shoulder replacement typically 6 months if we do an attempted repair of the rotator cuff.  The hope would be that we would help to restore his motion given his shoulder joint contracture but recurrent contracture and nonhealing of the rotator cuff is very possible given the acute on chronic type picture.  I did  regarding his persistent hematoma no evidence of malignancy but did  on this unique finding and will need to be monitored.      Will need cardiac clearance  PAT  Plan for [facility] OR  Outpatient     The patient desires to proceed as soon as possible we discussed we would work on coordination with our  and with Dr. Pelaez.    Diagnoses and CPT Codes  1. Rotator cuff tear - Arthroscopic rotator cuff repair CPT Code 00231  2. Shoulder impingement syndrome - Arthroscopic subacromial decompression, minimal acromioplasty CPT 33887  3. Arthroscopic contracture release CPT code 21168  4. Arthroscopic extensive debridement 35842    I personally discussed the plan of care in detail with patient today. The patient verbally understands and agrees. I spent and counseled for approximately 25 minutes face to face, with greater than 50 % of the time counseling on the patient's diagnosis and plan discussed today.        Follow Up:   He desires to proceed with surgery.    dEuar Alves MD, FAAOS  Orthopedic Surgeon  Fellowship  Trained Shoulder and Elbow Surgeon  [facility]  Orthopedics and Sports Medicine  [facility], [address]  [place] [address]    09/22/20  11:04 EDT    Please note that portions of this note may have been completed with a voice recognition program. Efforts were made to edit the dictations, but occasionally words are mistranscribed.

## 2020-09-24 ENCOUNTER — PREP FOR SURGERY (OUTPATIENT)
Dept: OTHER | Facility: HOSPITAL | Age: 65
End: 2020-09-24

## 2020-09-24 ENCOUNTER — OFFICE VISIT (OUTPATIENT)
Dept: CARDIOLOGY | Facility: CLINIC | Age: 65
End: 2020-09-24

## 2020-09-24 VITALS
SYSTOLIC BLOOD PRESSURE: 112 MMHG | HEIGHT: 78 IN | WEIGHT: 311 LBS | BODY MASS INDEX: 35.98 KG/M2 | HEART RATE: 78 BPM | DIASTOLIC BLOOD PRESSURE: 68 MMHG

## 2020-09-24 DIAGNOSIS — M75.52 BURSITIS OF LEFT SHOULDER: ICD-10-CM

## 2020-09-24 DIAGNOSIS — M75.42 IMPINGEMENT SYNDROME OF LEFT SHOULDER: ICD-10-CM

## 2020-09-24 DIAGNOSIS — I25.10 CORONARY ARTERY DISEASE INVOLVING NATIVE CORONARY ARTERY OF NATIVE HEART WITHOUT ANGINA PECTORIS: Primary | ICD-10-CM

## 2020-09-24 DIAGNOSIS — M24.512 CONTRACTURE OF JOINT OF LEFT SHOULDER REGION: ICD-10-CM

## 2020-09-24 DIAGNOSIS — M75.122 NONTRAUMATIC COMPLETE TEAR OF LEFT ROTATOR CUFF: Primary | ICD-10-CM

## 2020-09-24 PROCEDURE — 99214 OFFICE O/P EST MOD 30 MIN: CPT | Performed by: PHYSICIAN ASSISTANT

## 2020-09-24 PROCEDURE — 93000 ELECTROCARDIOGRAM COMPLETE: CPT | Performed by: PHYSICIAN ASSISTANT

## 2020-09-24 RX ORDER — ROSUVASTATIN CALCIUM 20 MG/1
20 TABLET, COATED ORAL DAILY
Qty: 30 TABLET | Refills: 6 | Status: SHIPPED | OUTPATIENT
Start: 2020-09-24

## 2020-09-24 RX ORDER — CEFAZOLIN SODIUM 2 G/100ML
2 INJECTION, SOLUTION INTRAVENOUS ONCE
Status: CANCELLED | OUTPATIENT
Start: 2020-09-24 | End: 2020-09-24

## 2020-09-29 ENCOUNTER — RESULTS ENCOUNTER (OUTPATIENT)
Dept: CARDIOLOGY | Facility: CLINIC | Age: 65
End: 2020-09-29

## 2020-09-29 DIAGNOSIS — I25.10 CORONARY ARTERY DISEASE INVOLVING NATIVE CORONARY ARTERY OF NATIVE HEART WITHOUT ANGINA PECTORIS: ICD-10-CM

## 2020-10-08 ENCOUNTER — APPOINTMENT (OUTPATIENT)
Dept: PREADMISSION TESTING | Facility: HOSPITAL | Age: 65
End: 2020-10-08

## 2020-10-08 VITALS — WEIGHT: 312.8 LBS | BODY MASS INDEX: 36.19 KG/M2 | HEIGHT: 78 IN

## 2020-10-08 DIAGNOSIS — M24.512 CONTRACTURE OF JOINT OF LEFT SHOULDER REGION: ICD-10-CM

## 2020-10-08 DIAGNOSIS — M75.52 BURSITIS OF LEFT SHOULDER: ICD-10-CM

## 2020-10-08 DIAGNOSIS — M75.122 NONTRAUMATIC COMPLETE TEAR OF LEFT ROTATOR CUFF: ICD-10-CM

## 2020-10-08 DIAGNOSIS — M75.42 IMPINGEMENT SYNDROME OF LEFT SHOULDER: ICD-10-CM

## 2020-10-08 LAB
ALBUMIN SERPL-MCNC: 4.8 G/DL (ref 3.5–5.2)
ALBUMIN/GLOB SERPL: 1.8 G/DL
ALP SERPL-CCNC: 60 U/L (ref 39–117)
ALT SERPL W P-5'-P-CCNC: 36 U/L (ref 1–41)
ANION GAP SERPL CALCULATED.3IONS-SCNC: 12 MMOL/L (ref 5–15)
APTT PPP: 33.3 SECONDS (ref 24–37)
AST SERPL-CCNC: 27 U/L (ref 1–40)
BASOPHILS # BLD AUTO: 0.06 10*3/MM3 (ref 0–0.2)
BASOPHILS NFR BLD AUTO: 1.3 % (ref 0–1.5)
BILIRUB SERPL-MCNC: 0.4 MG/DL (ref 0–1.2)
BUN SERPL-MCNC: 16 MG/DL (ref 8–23)
BUN/CREAT SERPL: 17 (ref 7–25)
CALCIUM SPEC-SCNC: 9.3 MG/DL (ref 8.6–10.5)
CHLORIDE SERPL-SCNC: 104 MMOL/L (ref 98–107)
CO2 SERPL-SCNC: 23 MMOL/L (ref 22–29)
CREAT SERPL-MCNC: 0.94 MG/DL (ref 0.76–1.27)
DEPRECATED RDW RBC AUTO: 43 FL (ref 37–54)
EOSINOPHIL # BLD AUTO: 0.21 10*3/MM3 (ref 0–0.4)
EOSINOPHIL NFR BLD AUTO: 4.6 % (ref 0.3–6.2)
ERYTHROCYTE [DISTWIDTH] IN BLOOD BY AUTOMATED COUNT: 12.4 % (ref 12.3–15.4)
GFR SERPL CREATININE-BSD FRML MDRD: 81 ML/MIN/1.73
GLOBULIN UR ELPH-MCNC: 2.6 GM/DL
GLUCOSE SERPL-MCNC: 114 MG/DL (ref 65–99)
HBA1C MFR BLD: 6.7 % (ref 4.8–5.6)
HCT VFR BLD AUTO: 41.4 % (ref 37.5–51)
HGB BLD-MCNC: 13.4 G/DL (ref 13–17.7)
IMM GRANULOCYTES # BLD AUTO: 0.02 10*3/MM3 (ref 0–0.05)
IMM GRANULOCYTES NFR BLD AUTO: 0.4 % (ref 0–0.5)
INR PPP: 1.05 (ref 0.85–1.16)
LYMPHOCYTES # BLD AUTO: 1.27 10*3/MM3 (ref 0.7–3.1)
LYMPHOCYTES NFR BLD AUTO: 27.8 % (ref 19.6–45.3)
MCH RBC QN AUTO: 30.3 PG (ref 26.6–33)
MCHC RBC AUTO-ENTMCNC: 32.4 G/DL (ref 31.5–35.7)
MCV RBC AUTO: 93.7 FL (ref 79–97)
MONOCYTES # BLD AUTO: 0.26 10*3/MM3 (ref 0.1–0.9)
MONOCYTES NFR BLD AUTO: 5.7 % (ref 5–12)
NEUTROPHILS NFR BLD AUTO: 2.75 10*3/MM3 (ref 1.7–7)
NEUTROPHILS NFR BLD AUTO: 60.2 % (ref 42.7–76)
NRBC BLD AUTO-RTO: 0 /100 WBC (ref 0–0.2)
PLATELET # BLD AUTO: 184 10*3/MM3 (ref 140–450)
PMV BLD AUTO: 10.2 FL (ref 6–12)
POTASSIUM SERPL-SCNC: 4.4 MMOL/L (ref 3.5–5.2)
PROT SERPL-MCNC: 7.4 G/DL (ref 6–8.5)
PROTHROMBIN TIME: 13.5 SECONDS (ref 11.5–14)
RBC # BLD AUTO: 4.42 10*6/MM3 (ref 4.14–5.8)
SODIUM SERPL-SCNC: 139 MMOL/L (ref 136–145)
WBC # BLD AUTO: 4.57 10*3/MM3 (ref 3.4–10.8)

## 2020-10-08 PROCEDURE — 36415 COLL VENOUS BLD VENIPUNCTURE: CPT

## 2020-10-08 PROCEDURE — 85610 PROTHROMBIN TIME: CPT | Performed by: ORTHOPAEDIC SURGERY

## 2020-10-08 PROCEDURE — 85730 THROMBOPLASTIN TIME PARTIAL: CPT | Performed by: ORTHOPAEDIC SURGERY

## 2020-10-08 PROCEDURE — 85025 COMPLETE CBC W/AUTO DIFF WBC: CPT | Performed by: ORTHOPAEDIC SURGERY

## 2020-10-08 PROCEDURE — 83036 HEMOGLOBIN GLYCOSYLATED A1C: CPT | Performed by: ORTHOPAEDIC SURGERY

## 2020-10-08 PROCEDURE — 80053 COMPREHEN METABOLIC PANEL: CPT | Performed by: ORTHOPAEDIC SURGERY

## 2020-10-16 ENCOUNTER — APPOINTMENT (OUTPATIENT)
Dept: PREADMISSION TESTING | Facility: HOSPITAL | Age: 65
End: 2020-10-16

## 2020-10-16 PROCEDURE — U0004 COV-19 TEST NON-CDC HGH THRU: HCPCS

## 2020-10-16 PROCEDURE — C9803 HOPD COVID-19 SPEC COLLECT: HCPCS

## 2020-10-18 ENCOUNTER — ANESTHESIA EVENT (OUTPATIENT)
Dept: PERIOP | Facility: HOSPITAL | Age: 65
End: 2020-10-18

## 2020-10-18 LAB — SARS-COV-2 RNA RESP QL NAA+PROBE: NOT DETECTED

## 2020-10-18 RX ORDER — FAMOTIDINE 10 MG/ML
20 INJECTION, SOLUTION INTRAVENOUS ONCE
Status: CANCELLED | OUTPATIENT
Start: 2020-10-18 | End: 2020-10-18

## 2020-10-19 ENCOUNTER — ANESTHESIA (OUTPATIENT)
Dept: PERIOP | Facility: HOSPITAL | Age: 65
End: 2020-10-19

## 2020-10-19 ENCOUNTER — TELEPHONE (OUTPATIENT)
Dept: ORTHOPEDIC SURGERY | Facility: CLINIC | Age: 65
End: 2020-10-19

## 2020-10-19 ENCOUNTER — HOSPITAL ENCOUNTER (OUTPATIENT)
Facility: HOSPITAL | Age: 65
Discharge: HOME | End: 2020-10-19
Attending: ORTHOPAEDIC SURGERY | Admitting: ORTHOPAEDIC SURGERY

## 2020-10-19 VITALS
OXYGEN SATURATION: 96 % | DIASTOLIC BLOOD PRESSURE: 97 MMHG | RESPIRATION RATE: 18 BRPM | HEART RATE: 78 BPM | TEMPERATURE: 98 F | SYSTOLIC BLOOD PRESSURE: 145 MMHG

## 2020-10-19 DIAGNOSIS — M75.42 IMPINGEMENT SYNDROME OF LEFT SHOULDER: ICD-10-CM

## 2020-10-19 DIAGNOSIS — M24.512 CONTRACTURE OF JOINT OF LEFT SHOULDER REGION: ICD-10-CM

## 2020-10-19 DIAGNOSIS — M75.52 BURSITIS OF LEFT SHOULDER: ICD-10-CM

## 2020-10-19 DIAGNOSIS — M75.122 NONTRAUMATIC COMPLETE TEAR OF LEFT ROTATOR CUFF: ICD-10-CM

## 2020-10-19 DIAGNOSIS — M75.122 NONTRAUMATIC COMPLETE TEAR OF LEFT ROTATOR CUFF: Primary | ICD-10-CM

## 2020-10-19 LAB
GLUCOSE BLDC GLUCOMTR-MCNC: 109 MG/DL (ref 70–130)
GLUCOSE BLDC GLUCOMTR-MCNC: 165 MG/DL (ref 70–130)

## 2020-10-19 PROCEDURE — 0: Performed by: ORTHOPAEDIC SURGERY

## 2020-10-19 PROCEDURE — 25010000002 PROPOFOL 10 MG/ML EMULSION: Performed by: NURSE ANESTHETIST, CERTIFIED REGISTERED

## 2020-10-19 PROCEDURE — A9270 NON-COVERED ITEM OR SERVICE: HCPCS | Performed by: ANESTHESIOLOGY

## 2020-10-19 PROCEDURE — 25010000002 ONDANSETRON PER 1 MG: Performed by: NURSE ANESTHETIST, CERTIFIED REGISTERED

## 2020-10-19 PROCEDURE — 25010000002 FENTANYL CITRATE (PF) 100 MCG/2ML SOLUTION: Performed by: NURSE ANESTHETIST, CERTIFIED REGISTERED

## 2020-10-19 PROCEDURE — 76942 ECHO GUIDE FOR BIOPSY: CPT | Performed by: ORTHOPAEDIC SURGERY

## 2020-10-19 PROCEDURE — 29826 SHO ARTHRS SRG DECOMPRESSION: CPT | Performed by: ORTHOPAEDIC SURGERY

## 2020-10-19 PROCEDURE — 25010000002 NEOSTIGMINE 10 MG/10ML SOLUTION: Performed by: NURSE ANESTHETIST, CERTIFIED REGISTERED

## 2020-10-19 PROCEDURE — 25010000002 DEXAMETHASONE PER 1 MG: Performed by: NURSE ANESTHETIST, CERTIFIED REGISTERED

## 2020-10-19 PROCEDURE — 63710000001 FAMOTIDINE 20 MG TABLET: Performed by: ANESTHESIOLOGY

## 2020-10-19 PROCEDURE — 82962 GLUCOSE BLOOD TEST: CPT

## 2020-10-19 PROCEDURE — 25010000002 ROPIVACAINE PER 1 MG: Performed by: NURSE ANESTHETIST, CERTIFIED REGISTERED

## 2020-10-19 PROCEDURE — S0260 H&P FOR SURGERY: HCPCS | Performed by: ORTHOPAEDIC SURGERY

## 2020-10-19 PROCEDURE — C1889 IMPLANT/INSERT DEVICE, NOC: HCPCS | Performed by: ORTHOPAEDIC SURGERY

## 2020-10-19 PROCEDURE — 29823 SHO ARTHRS SRG XTNSV DBRDMT: CPT | Performed by: ORTHOPAEDIC SURGERY

## 2020-10-19 PROCEDURE — 25010000003 LIDOCAINE 1 % SOLUTION: Performed by: NURSE ANESTHETIST, CERTIFIED REGISTERED

## 2020-10-19 PROCEDURE — 29826 SHO ARTHRS SRG DECOMPRESSION: CPT | Performed by: PHYSICIAN ASSISTANT

## 2020-10-19 PROCEDURE — 29823 SHO ARTHRS SRG XTNSV DBRDMT: CPT | Performed by: PHYSICIAN ASSISTANT

## 2020-10-19 RX ORDER — PROMETHAZINE HYDROCHLORIDE 25 MG/1
25 TABLET ORAL ONCE AS NEEDED
Status: DISCONTINUED | OUTPATIENT
Start: 2020-10-19 | End: 2020-10-19 | Stop reason: HOSPADM

## 2020-10-19 RX ORDER — ONDANSETRON 2 MG/ML
INJECTION INTRAMUSCULAR; INTRAVENOUS AS NEEDED
Status: DISCONTINUED | OUTPATIENT
Start: 2020-10-19 | End: 2020-10-19 | Stop reason: SURG

## 2020-10-19 RX ORDER — FENTANYL CITRATE 50 UG/ML
50 INJECTION, SOLUTION INTRAMUSCULAR; INTRAVENOUS
Status: DISCONTINUED | OUTPATIENT
Start: 2020-10-19 | End: 2020-10-19 | Stop reason: HOSPADM

## 2020-10-19 RX ORDER — SODIUM CHLORIDE 0.9 % (FLUSH) 0.9 %
10 SYRINGE (ML) INJECTION AS NEEDED
Status: DISCONTINUED | OUTPATIENT
Start: 2020-10-19 | End: 2020-10-19 | Stop reason: HOSPADM

## 2020-10-19 RX ORDER — CEFAZOLIN SODIUM IN 0.9 % NACL 3 G/100 ML
3 INTRAVENOUS SOLUTION, PIGGYBACK (ML) INTRAVENOUS ONCE
Status: COMPLETED | OUTPATIENT
Start: 2020-10-19 | End: 2020-10-19

## 2020-10-19 RX ORDER — FENTANYL CITRATE 50 UG/ML
INJECTION, SOLUTION INTRAMUSCULAR; INTRAVENOUS
Status: COMPLETED | OUTPATIENT
Start: 2020-10-19 | End: 2020-10-19

## 2020-10-19 RX ORDER — SODIUM CHLORIDE 0.9 % (FLUSH) 0.9 %
10 SYRINGE (ML) INJECTION EVERY 12 HOURS SCHEDULED
Status: DISCONTINUED | OUTPATIENT
Start: 2020-10-19 | End: 2020-10-19 | Stop reason: HOSPADM

## 2020-10-19 RX ORDER — GLYCOPYRROLATE 0.2 MG/ML
INJECTION INTRAMUSCULAR; INTRAVENOUS AS NEEDED
Status: DISCONTINUED | OUTPATIENT
Start: 2020-10-19 | End: 2020-10-19 | Stop reason: SURG

## 2020-10-19 RX ORDER — DEXAMETHASONE SODIUM PHOSPHATE 4 MG/ML
INJECTION, SOLUTION INTRA-ARTICULAR; INTRALESIONAL; INTRAMUSCULAR; INTRAVENOUS; SOFT TISSUE AS NEEDED
Status: DISCONTINUED | OUTPATIENT
Start: 2020-10-19 | End: 2020-10-19 | Stop reason: SURG

## 2020-10-19 RX ORDER — ONDANSETRON 4 MG/1
4 TABLET, FILM COATED ORAL EVERY 6 HOURS PRN
Qty: 30 TABLET | Refills: 1 | Status: SHIPPED | OUTPATIENT
Start: 2020-10-19 | End: 2020-10-27

## 2020-10-19 RX ORDER — ATRACURIUM BESYLATE 10 MG/ML
INJECTION, SOLUTION INTRAVENOUS AS NEEDED
Status: DISCONTINUED | OUTPATIENT
Start: 2020-10-19 | End: 2020-10-19 | Stop reason: SURG

## 2020-10-19 RX ORDER — MAGNESIUM HYDROXIDE 1200 MG/15ML
LIQUID ORAL AS NEEDED
Status: DISCONTINUED | OUTPATIENT
Start: 2020-10-19 | End: 2020-10-19 | Stop reason: HOSPADM

## 2020-10-19 RX ORDER — PROPOFOL 10 MG/ML
VIAL (ML) INTRAVENOUS AS NEEDED
Status: DISCONTINUED | OUTPATIENT
Start: 2020-10-19 | End: 2020-10-19 | Stop reason: SURG

## 2020-10-19 RX ORDER — EPHEDRINE SULFATE 50 MG/ML
5 INJECTION, SOLUTION INTRAVENOUS ONCE AS NEEDED
Status: DISCONTINUED | OUTPATIENT
Start: 2020-10-19 | End: 2020-10-19 | Stop reason: HOSPADM

## 2020-10-19 RX ORDER — NEOSTIGMINE METHYLSULFATE 1 MG/ML
INJECTION, SOLUTION INTRAVENOUS AS NEEDED
Status: DISCONTINUED | OUTPATIENT
Start: 2020-10-19 | End: 2020-10-19 | Stop reason: SURG

## 2020-10-19 RX ORDER — HYDROCODONE BITARTRATE AND ACETAMINOPHEN 10; 325 MG/1; MG/1
1 TABLET ORAL EVERY 4 HOURS PRN
Qty: 42 TABLET | Refills: 0 | Status: SHIPPED | OUTPATIENT
Start: 2020-10-19 | End: 2020-10-26

## 2020-10-19 RX ORDER — METHOCARBAMOL 500 MG/1
500 TABLET, FILM COATED ORAL 3 TIMES DAILY PRN
Qty: 40 TABLET | Refills: 1 | Status: SHIPPED | OUTPATIENT
Start: 2020-10-19 | End: 2020-11-02

## 2020-10-19 RX ORDER — LIDOCAINE HYDROCHLORIDE 10 MG/ML
INJECTION, SOLUTION INFILTRATION; PERINEURAL AS NEEDED
Status: DISCONTINUED | OUTPATIENT
Start: 2020-10-19 | End: 2020-10-19 | Stop reason: SURG

## 2020-10-19 RX ORDER — LIDOCAINE HYDROCHLORIDE 10 MG/ML
0.5 INJECTION, SOLUTION EPIDURAL; INFILTRATION; INTRACAUDAL; PERINEURAL ONCE AS NEEDED
Status: COMPLETED | OUTPATIENT
Start: 2020-10-19 | End: 2020-10-19

## 2020-10-19 RX ORDER — ESMOLOL HYDROCHLORIDE 10 MG/ML
INJECTION INTRAVENOUS AS NEEDED
Status: DISCONTINUED | OUTPATIENT
Start: 2020-10-19 | End: 2020-10-19 | Stop reason: SURG

## 2020-10-19 RX ORDER — BUPIVACAINE HYDROCHLORIDE 2.5 MG/ML
INJECTION, SOLUTION EPIDURAL; INFILTRATION; INTRACAUDAL
Status: COMPLETED | OUTPATIENT
Start: 2020-10-19 | End: 2020-10-19

## 2020-10-19 RX ORDER — FAMOTIDINE 20 MG/1
20 TABLET, FILM COATED ORAL ONCE
Status: COMPLETED | OUTPATIENT
Start: 2020-10-19 | End: 2020-10-19

## 2020-10-19 RX ORDER — SODIUM CHLORIDE, SODIUM LACTATE, POTASSIUM CHLORIDE, CALCIUM CHLORIDE 600; 310; 30; 20 MG/100ML; MG/100ML; MG/100ML; MG/100ML
9 INJECTION, SOLUTION INTRAVENOUS CONTINUOUS
Status: DISCONTINUED | OUTPATIENT
Start: 2020-10-19 | End: 2020-10-19 | Stop reason: HOSPADM

## 2020-10-19 RX ADMIN — ONDANSETRON 4 MG: 2 INJECTION INTRAMUSCULAR; INTRAVENOUS at 08:33

## 2020-10-19 RX ADMIN — ESMOLOL HYDROCHLORIDE 50 MG: 10 INJECTION, SOLUTION INTRAVENOUS at 07:38

## 2020-10-19 RX ADMIN — LIDOCAINE HYDROCHLORIDE 0.2 ML: 10 INJECTION, SOLUTION EPIDURAL; INFILTRATION; INTRACAUDAL; PERINEURAL at 06:40

## 2020-10-19 RX ADMIN — Medication 3 G: at 07:27

## 2020-10-19 RX ADMIN — DEXAMETHASONE SODIUM PHOSPHATE 8 MG: 4 INJECTION, SOLUTION INTRAMUSCULAR; INTRAVENOUS at 07:31

## 2020-10-19 RX ADMIN — ESMOLOL HYDROCHLORIDE 50 MG: 10 INJECTION, SOLUTION INTRAVENOUS at 07:31

## 2020-10-19 RX ADMIN — FENTANYL CITRATE 100 MCG: 50 INJECTION, SOLUTION INTRAMUSCULAR; INTRAVENOUS at 07:07

## 2020-10-19 RX ADMIN — PROPOFOL 200 MG: 10 INJECTION, EMULSION INTRAVENOUS at 07:31

## 2020-10-19 RX ADMIN — NEOSTIGMINE 4 MG: 1 INJECTION INTRAVENOUS at 08:35

## 2020-10-19 RX ADMIN — LIDOCAINE HYDROCHLORIDE 50 MG: 10 INJECTION, SOLUTION INFILTRATION; PERINEURAL at 07:31

## 2020-10-19 RX ADMIN — BUPIVACAINE HYDROCHLORIDE 15 ML: 2.5 INJECTION, SOLUTION EPIDURAL; INFILTRATION; INTRACAUDAL; PERINEURAL at 07:07

## 2020-10-19 RX ADMIN — GLYCOPYRROLATE 0.4 MG: 0.2 INJECTION INTRAMUSCULAR; INTRAVENOUS at 08:35

## 2020-10-19 RX ADMIN — FAMOTIDINE 20 MG: 20 TABLET, FILM COATED ORAL at 06:41

## 2020-10-19 RX ADMIN — ATRACURIUM BESYLATE 50 MG: 10 INJECTION, SOLUTION INTRAVENOUS at 07:31

## 2020-10-19 RX ADMIN — SODIUM CHLORIDE, POTASSIUM CHLORIDE, SODIUM LACTATE AND CALCIUM CHLORIDE 9 ML/HR: 600; 310; 30; 20 INJECTION, SOLUTION INTRAVENOUS at 06:40

## 2020-10-19 RX ADMIN — BUPIVACAINE HYDROCHLORIDE 5 ML: 2.5 INJECTION, SOLUTION EPIDURAL; INFILTRATION; INTRACAUDAL; PERINEURAL at 08:33

## 2020-10-19 NOTE — ANESTHESIA PROCEDURE NOTES
Airway  Urgency: elective    Date/Time: 10/19/2020 7:34 AM  Airway not difficult    General Information and Staff    Patient location during procedure: OR  CRNA: Eleno Ricketts CRNA    Indications and Patient Condition  Indications for airway management: airway protection    Preoxygenated: yes  MILS not maintained throughout  Mask difficulty assessment: 1 - vent by mask    Final Airway Details  Final airway type: endotracheal airway      Successful airway: ETT  Cuffed: yes   Successful intubation technique: direct laryngoscopy  Endotracheal tube insertion site: oral  Blade: Beatrice  Blade size: 3  ETT size (mm): 7.0  Cormack-Lehane Classification: grade I - full view of glottis  Placement verified by: chest auscultation and capnometry   Cuff volume (mL): 10  Measured from: lips  ETT/EBT  to lips (cm): 20  Number of attempts at approach: 1  Assessment: lips, teeth, and gum same as pre-op and atraumatic intubation    Additional Comments  Negative epigastric sounds, Breath sound equal bilaterally with symmetric chest rise and fall

## 2020-10-19 NOTE — ANESTHESIA PREPROCEDURE EVALUATION
Anesthesia Evaluation                  Airway   Mallampati: I  TM distance: >3 FB  Neck ROM: full  No difficulty expected  Dental      Pulmonary    (+) sleep apnea,   Cardiovascular     ECG reviewed    (+) hypertension, CAD, dysrhythmias, hyperlipidemia,       Neuro/Psych  GI/Hepatic/Renal/Endo    (+) morbid obesity, GERD,  renal disease, diabetes mellitus,     Musculoskeletal     Abdominal    Substance History      OB/GYN          Other                        Anesthesia Plan    ASA 3     general   (Isnb/c)  intravenous induction     Anesthetic plan, all risks, benefits, and alternatives have been provided, discussed and informed consent has been obtained with: patient.    Plan discussed with CRNA.

## 2020-10-19 NOTE — ANESTHESIA PROCEDURE NOTES
Interscalene Block    Pre-sedation assessment completed: 10/19/2020 7:03 AM    Patient reassessed immediately prior to procedure    Patient location during procedure: pre-op  Start time: 10/19/2020 7:03 AM  Reason for block: at surgeon's request and post-op pain management  Performed by  CRNA: Eleno Ricketts CRNA  Assisted by: Jennifer Fernandes CRNA  Preanesthetic Checklist  Completed: patient identified, site marked, surgical consent, pre-op evaluation, timeout performed, IV checked, risks and benefits discussed and monitors and equipment checked  Prep:  Pt Position: right lateral decubitus  Sterile barriers:cap, gloves, mask and sterile barriers  Prep: ChloraPrep  Patient monitoring: blood pressure monitoring, continuous pulse oximetry and EKG  Procedure  Sedation:yes  Performed under: local infiltration  Guidance:ultrasound guided  Images:still images obtained, printed/placed on chart    Laterality:left  Block Type:interscalene  Injection Technique:catheter  Needle Type:Tuohy and echogenic  Needle Gauge:18 G  Resistance on Injection: none  Catheter Size:20 G (20g)  Cath Depth at skin: 12 cm    Medications Used: bupivacaine PF (MARCAINE) 0.25 % injection, 15 mL  fentaNYL citrate (PF) (SUBLIMAZE) injection, 100 mcg  Med admintered at 10/19/2020 7:07 AM      Medications  Preservative Free Saline:5ml    Post Assessment  Injection Assessment: negative aspiration for heme, no paresthesia on injection and incremental injection  Patient Tolerance:comfortable throughout block  Complications:no  Additional Notes  Procedure:                 The pt was placed in semifowlers position with a slight tilt of the thorax contralateral to the insertion site.  The Insertion Site was prepped and draped in sterile fashion.  The pt was anesthetized with  IV Sedation( see meds) and  Skin and cutaneous tissue was infiltrated and anesthetized with 1% Lidocaine 3 mls via a 25g needle.  Utilizing ultrasound guidance, a BBraun 2 inch 18  g Contiplex echogenic touhy needle was advanced in-plane.  Hydro dissection of tissue was achieved with Normal saline. Major vessels(carotid and Internal Jugular) where visualized as the brachial plexus was approached at the approximate level of C-7/ T-1.  Cervical 5 and Branches of Cervical 6 nerve roots where visualized and the needle tip was placed posterior at the level of C-6 roots.  LA spread was visualized and injection was made incrementally every 5 mls with aspiration. Injection pressure was normal or little, there was no intraneural injection, no vascular injection.      The BBraun 20 g wire stylet  catheter was then placed under US guidance on the posterior aspect of the Brachial Plexus.  Location of catheter was confirmed with NS injection visualized with US . The tuohy was then removed and the skin was sealed with Skin AFix at catheter insertion site.  Skin was prepped with mastisol and the labeled catheter  was secured with steristrips and a CHG tegaderm. Thank You.

## 2020-10-19 NOTE — ANESTHESIA POSTPROCEDURE EVALUATION
Patient: Raoul Roberto    Procedure Summary     Date: 10/19/20 Room / Location:  [facility]    Anesthesia Start: 0726 Anesthesia Stop: 0850    Procedure: SHOULDER ARTHROSCOPY WITH EXTENSIVE DEBRIDEMENT AND SUBACROMIAL DECOMPRESSION (Left Shoulder) Diagnosis:       Nontraumatic complete tear of left rotator cuff      Impingement syndrome of left shoulder      Bursitis of left shoulder      Contracture of joint of left shoulder region      (Nontraumatic complete tear of left rotator cuff [M75.122])      (Impingement syndrome of left shoulder [M75.42])      (Bursitis of left shoulder [M75.52])      (Contracture of joint of left shoulder region [M24.512])    Surgeon: Eduar Alves MD Provider: Raoul Matamoros MD    Anesthesia Type: general ASA Status: 3          Anesthesia Type: general    Vitals  No vitals data found for the desired time range.          Post Anesthesia Care and Evaluation    Patient location during evaluation: PACU  Patient participation: complete - patient participated  Level of consciousness: awake and alert  Pain score: 0  Pain management: adequate  Airway patency: patent  Anesthetic complications: No anesthetic complications  PONV Status: none  Cardiovascular status: hemodynamically stable and acceptable  Respiratory status: nonlabored ventilation, acceptable and nasal cannula  Hydration status: acceptable

## 2020-10-19 NOTE — TELEPHONE ENCOUNTER
The pharmacy called and asked about writing a maxium of 5 norco per day instead of 6 because of the  Morphine he is on.  I ok'd it.  [place]ly

## 2020-11-05 ENCOUNTER — OFFICE VISIT (OUTPATIENT)
Dept: ORTHOPEDIC SURGERY | Facility: CLINIC | Age: 65
End: 2020-11-05

## 2020-11-05 DIAGNOSIS — Z98.890 STATUS POST ARTHROSCOPY OF LEFT SHOULDER: Primary | ICD-10-CM

## 2020-11-05 PROCEDURE — 99024 POSTOP FOLLOW-UP VISIT: CPT | Performed by: ORTHOPAEDIC SURGERY

## 2020-11-05 RX ORDER — BUPROPION HYDROCHLORIDE 200 MG/1
200 TABLET, EXTENDED RELEASE ORAL EVERY MORNING
COMMUNITY
Start: 2020-10-01

## 2020-11-05 RX ORDER — FLUOXETINE HYDROCHLORIDE 20 MG/1
80 CAPSULE ORAL DAILY
COMMUNITY
Start: 2020-10-01

## 2020-11-05 NOTE — PROGRESS NOTES
[facility] [place] [facility] [place]       Office Follow Up Visit       Patient Name: Raoul Roberto    Chief Complaint:   Chief Complaint   Patient presents with   • Post-op     17 days s/p Shoulder Arthroscopy With Extensive Debridement And Subacromial Decompression - Left 10/19/20       Referring Physician: No ref. provider found    History of Present Illness:   It has been 17  day(s) since Raoul Roberto's last visit. Raoul Roberto returns to clinic today for F/U: postoperative follow-up of leftBody Part: shoulderReason: arthroscopy. The issue has been ongoing for 3 week(s). Raoul Roberto rates HIS/HER: hispain at 3/10 on the pain scale. Previous/current treatments: bracing. Current symptoms:Symptoms: pain. The pain is worse with certain movements; resting improves the pain. Overall, he/she: heis doing better. I have reviewed the patient's history of present illness as noted/entered above.    I have reviewed the patient's past medical history, surgical history, social history, family history, medications, and allergies as noted in the electronic medical record and as noted/entered.  I have reviewed the patient's review of systems as noted/enter and updated as noted in the patient's HPI.      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  He has some early arthritic findings but not that severe.  We discussed we will hold off on a reverse until hopefully years down the line.  I did perform a contracture release and a biceps tenotomy for his partially torn and subluxated biceps.    He understands and has noted some relief.  I do think that he can continue on this course hopefully for many years.  If not a reverse shoulder replacement would be the option in the  future.        Subjective   Subjective      Review of Systems   Constitutional: Negative.  Negative for chills, fatigue and fever.   HENT: Negative.  Negative for congestion and dental problem.    Eyes: Negative.  Negative for blurred vision.   Respiratory: Negative.  Negative for shortness of breath.    Cardiovascular: Negative.  Negative for leg swelling.   Gastrointestinal: Negative.  Negative for abdominal pain.   Endocrine: Negative.  Negative for polyuria.   Genitourinary: Negative.  Negative for difficulty urinating.   Musculoskeletal: Positive for arthralgias.   Skin: Negative.    Allergic/Immunologic: Negative.    Neurological: Negative.    Hematological: Negative.  Negative for adenopathy.   Psychiatric/Behavioral: Negative.  Negative for behavioral problems.        Past Medical History:   Past Medical History:   Diagnosis Date   • Anxiety and depression    • Arthritis    • Atrial fibrillation (CMS/HCC)    • Coronary artery disease    • Diabetes mellitus (CMS/HCC)    • GERD (gastroesophageal reflux disease)    • Hyperlipidemia    • Hypertension    • Kidney stone    • Seasonal allergies    • Sleep apnea     has not worn bipap in a few months    • SVT (supraventricular tachycardia) (CMS/HCC)        Past Surgical History:   Past Surgical History:   Procedure Laterality Date   • CARDIAC CATHETERIZATION     • CARDIAC ELECTROPHYSIOLOGY PROCEDURE N/A 8/22/2019    Procedure: EPS+/-RFA. Stop Sotalol 4 days prior. DNS meds.;  Surgeon: Jr Louis MD;  Location:  [facility];  Service: Cardiology   • COLONOSCOPY N/A 10/2/2018    Procedure: COLONOSCOPY;  Surgeon: Guille Addison MD;  Location:  [facility];  Service: Gastroenterology   • CORONARY ANGIOPLASTY WITH STENT PLACEMENT     • ENDOSCOPY N/A 10/2/2018    Procedure: ESOPHAGOGASTRODUODENOSCOPY;  Surgeon: Guille Addison MD;  Location:  [facility];  Service: Gastroenterology   • KNEE SURGERY Right     1973   • ROTATOR CUFF REPAIR  Right    • SHOULDER ARTHROSCOPY W/ ROTATOR CUFF REPAIR Left 10/19/2020    Procedure: SHOULDER ARTHROSCOPY WITH EXTENSIVE DEBRIDEMENT AND SUBACROMIAL DECOMPRESSION;  Surgeon: Eduar Alves MD;  Location: [facility];  Service: Orthopedics;  Laterality: Left;       Family History:   Family History   Problem Relation Age of Onset   • Diabetes Mother    • Breast cancer Mother    • Cancer Mother    • Osteoarthritis Mother    • Hypertension Mother    • Heart attack Father         3 times   • Prostate cancer Father    • Aneurysm Father         abdomen   • Cancer Father    • Hypertension Father        Social History:   Social History     Socioeconomic History   • Marital status:      Spouse name: Not on file   • Number of children: Not on file   • Years of education: Not on file   • Highest education level: Not on file   Tobacco Use   • Smoking status: Never Smoker   • Smokeless tobacco: Never Used   Substance and Sexual Activity   • Alcohol use: Yes     Comment: rare   • Drug use: No   • Sexual activity: Defer       Medications:   Current Outpatient Medications:   •  aspirin 81 MG EC tablet, Take 81 mg by mouth Daily., Disp: , Rfl:   •  buPROPion (ZYBAN) 150 MG 12 hr tablet, Take 150 mg by mouth 2 (Two) Times a Day., Disp: , Rfl:   •  buPROPion SR (WELLBUTRIN SR) 200 MG 12 hr tablet, Take 200 mg by mouth Every Morning., Disp: , Rfl:   •  doxepin (SINEquan) 10 MG capsule, Take 10 mg by mouth Daily., Disp: , Rfl:   •  FLUoxetine (PROzac) 20 MG capsule, Take 80 mg by mouth Daily., Disp: , Rfl:   •  lisinopril (PRINIVIL,ZESTRIL) 20 MG tablet, Take 20 mg by mouth Daily., Disp: , Rfl:   •  metFORMIN (GLUCOPHAGE) 500 MG tablet, Take 1,500 mg by mouth Every Night., Disp: , Rfl:   •  Omega-3 Fatty Acids (FISH OIL) 1000 MG capsule capsule, Take 1,000 mg by mouth Every Night., Disp: , Rfl:   •  pantoprazole (PROTONIX) 40 MG EC tablet, Take 1 tablet by mouth 2 (Two) Times a Day., Disp: 180 tablet, Rfl: 3  •  QUEtiapine  (SEROquel) 100 MG tablet, Take 100 mg by mouth Daily., Disp: , Rfl:   •  rosuvastatin (CRESTOR) 20 MG tablet, Take 1 tablet by mouth Daily., Disp: 30 tablet, Rfl: 6    Allergies: No Known Allergies    The following portions of the patient's history were reviewed and updated as appropriate: allergies, current medications, past family history, past medical history, past social history, past surgical history and problem list.        Objective    Objective      Vital Signs: There were no vitals filed for this visit.    Ortho Exam:  General: comfortable  Vascular: 2+ radial pulse  Neurologic: sensation to light touch is intact distally, elbow flexion/elbow extension/wrist flexion/wrist extension/hand intrinsics intact, able to fire deltoid; no residual defects noted from the block  Dermatologic: sutures were removed, surgical incisions are well appearing, with no drainage or surrounding erythema; no signs or symptoms of infection or DVT  Biceps: no deformity noted  Shoulder range of motion: Improved active and passive range of motion compared to preop    Results Review:  Imaging Results (Last 24 Hours)     Procedure Component Value Units Date/Time    XR Shoulder 1 View Left [[id number]] Resulted: 11/05/20 1038     Updated: 11/05/20 1038    Narrative:      Imaging: shoulder x-rays 1 view - AP x-ray views    Side: LEFT    Indication for shoulder x-ray 1 view: shoulder pain, postop evaluation   following surgery    Comparison: the current xray was compared to preoperative imaging.    Findings: No acute bony pathology. Well appearing and well positioned   compared to preoperative imaging.  Located and no fracture noted.    I personally reviewed the above x-rays and discussed with the patient.            Procedures          Assessment / Plan      Assessment/Plan:   Problem List Items Addressed This Visit        Other    Status post arthroscopy of left shoulder - Primary    Relevant Orders    XR Shoulder 1 View Left (Completed)     Ambulatory Referral to Physical Therapy Evaluate and treat, Ortho, POST OP (Completed)        Doing well postop.  Provided 3 home exercise programs and formal physical therapy so that he can keep the shoulder moving to preserve his active and passive range of motion to prevent recurrence of stiffness.    I will see him back in 2 months to reassess.    Follow Up:   Return in about 2 months (around 1/5/2021).      Eduar Alves MD, [facility]  Orthopedic Surgeon  Fellowship Trained Shoulder and Elbow Surgeon  [facility]  Orthopedics and Sports Medicine  [address] [place] [address], [address]  [place] [address]    11/05/20  10:50 EST    Please note that portions of this note may have been completed with a voice recognition program. Efforts were made to edit the dictations, but occasionally words are mistranscribed.

## 2021-01-05 ENCOUNTER — OFFICE VISIT (OUTPATIENT)
Dept: ORTHOPEDIC SURGERY | Facility: CLINIC | Age: 66
End: 2021-01-05

## 2021-01-05 DIAGNOSIS — M75.42 IMPINGEMENT SYNDROME OF LEFT SHOULDER: ICD-10-CM

## 2021-01-05 DIAGNOSIS — Z98.890 STATUS POST ARTHROSCOPY OF LEFT SHOULDER: Primary | ICD-10-CM

## 2021-01-05 DIAGNOSIS — M24.512 CONTRACTURE OF JOINT OF LEFT SHOULDER REGION: ICD-10-CM

## 2021-01-05 DIAGNOSIS — M75.52 BURSITIS OF LEFT SHOULDER: ICD-10-CM

## 2021-01-05 DIAGNOSIS — M75.122 NONTRAUMATIC COMPLETE TEAR OF LEFT ROTATOR CUFF: ICD-10-CM

## 2021-01-05 PROCEDURE — 99024 POSTOP FOLLOW-UP VISIT: CPT | Performed by: ORTHOPAEDIC SURGERY

## 2021-01-05 RX ORDER — BUPROPION HYDROCHLORIDE 100 MG/1
TABLET, EXTENDED RELEASE ORAL
COMMUNITY
Start: 2020-11-13 | End: 2021-01-05 | Stop reason: SDUPTHER

## 2021-01-05 NOTE — PROGRESS NOTES
"                                                                [facility] [place] [facility] [place] Follow Up       Office Follow Up Visit       Patient Name: Raoul Roberto    Chief Complaint:   Chief Complaint   Patient presents with   • Post-op     2 months follow up; 11 weeks status post Shoulder Arthroscopy With Extensive Debridement And Subacromial Decompression        Referring Physician: No ref. provider found    History of Present Illness:   It has been 2  month(s) since Raoul Roberto's last visit. Raoul Roberto returns to clinic today for F/U: postoperative follow-up of leftBody Part: shoulderReason: arthroscopy. The issue has been ongoing for 8 month(s). Raoul Roberto rates HIS/HER: hispain at 0/10 on the pain scale. resting improves the pain. Overall, he/she: heis doing better. I have reviewed the patient's history of present illness as noted/entered above.    I have reviewed the patient's past medical history, surgical history, social history, family history, medications, and allergies as noted in the electronic medical record and as noted/entered.  I have reviewed the patient's review of systems as noted/enter and updated as noted in the patient's HPI.      LEFT SHOULDER  \"better\"  He is pleased with results, but some limitations persist and he understands        Subjective   Subjective      Review of Systems   Constitutional: Negative.  Negative for chills, fatigue and fever.   HENT: Negative.  Negative for congestion and dental problem.    Eyes: Negative.  Negative for blurred vision.   Respiratory: Negative.  Negative for shortness of breath.    Cardiovascular: Negative.  Negative for leg swelling.   Gastrointestinal: Negative.  Negative for abdominal pain.   Endocrine: Negative.  Negative for polyuria.   Genitourinary: Negative.  Negative for difficulty urinating.   Musculoskeletal: Positive for arthralgias.   Skin: Negative.    Allergic/Immunologic: Negative.  "   Neurological: Negative.    Hematological: Negative.  Negative for adenopathy.   Psychiatric/Behavioral: Negative.  Negative for behavioral problems.        Past Medical History:   Past Medical History:   Diagnosis Date   • Anxiety and depression    • Arthritis    • Atrial fibrillation (CMS/HCC)    • Coronary artery disease    • Diabetes mellitus (CMS/HCC)    • GERD (gastroesophageal reflux disease)    • Hyperlipidemia    • Hypertension    • Kidney stone    • Seasonal allergies    • Sleep apnea     has not worn bipap in a few months    • SVT (supraventricular tachycardia) (CMS/HCC)        Past Surgical History:   Past Surgical History:   Procedure Laterality Date   • CARDIAC CATHETERIZATION     • CARDIAC ELECTROPHYSIOLOGY PROCEDURE N/A 8/22/2019    Procedure: EPS+/-RFA. Stop Sotalol 4 days prior. DNS meds.;  Surgeon: Jr Louis MD;  Location:  [facility];  Service: Cardiology   • COLONOSCOPY N/A 10/2/2018    Procedure: COLONOSCOPY;  Surgeon: Guille Addison MD;  Location:  [facility];  Service: Gastroenterology   • CORONARY ANGIOPLASTY WITH STENT PLACEMENT     • ENDOSCOPY N/A 10/2/2018    Procedure: ESOPHAGOGASTRODUODENOSCOPY;  Surgeon: Guille Addison MD;  Location:  [facility];  Service: Gastroenterology   • KNEE SURGERY Right     1973   • ROTATOR CUFF REPAIR Right    • SHOULDER ARTHROSCOPY W/ ROTATOR CUFF REPAIR Left 10/19/2020    Procedure: SHOULDER ARTHROSCOPY WITH EXTENSIVE DEBRIDEMENT AND SUBACROMIAL DECOMPRESSION;  Surgeon: Eduar Alves MD;  Location:  [facility];  Service: Orthopedics;  Laterality: Left;       Family History:   Family History   Problem Relation Age of Onset   • Diabetes Mother    • Breast cancer Mother    • Cancer Mother    • Osteoarthritis Mother    • Hypertension Mother    • Heart attack Father         3 times   • Prostate cancer Father    • Aneurysm Father         abdomen   • Cancer Father    • Hypertension Father        Social History:    Social History     Socioeconomic History   • Marital status:      Spouse name: Not on file   • Number of children: Not on file   • Years of education: Not on file   • Highest education level: Not on file   Tobacco Use   • Smoking status: Never Smoker   • Smokeless tobacco: Never Used   Substance and Sexual Activity   • Alcohol use: Yes     Comment: rare   • Drug use: No   • Sexual activity: Defer       Medications:   Current Outpatient Medications:   •  aspirin 81 MG EC tablet, Take 81 mg by mouth Daily., Disp: , Rfl:   •  buPROPion (ZYBAN) 150 MG 12 hr tablet, Take 150 mg by mouth 2 (Two) Times a Day., Disp: , Rfl:   •  buPROPion SR (WELLBUTRIN SR) 200 MG 12 hr tablet, Take 200 mg by mouth Every Morning., Disp: , Rfl:   •  doxepin (SINEquan) 10 MG capsule, Take 10 mg by mouth Daily., Disp: , Rfl:   •  FLUoxetine (PROzac) 20 MG capsule, Take 80 mg by mouth Daily., Disp: , Rfl:   •  lisinopril (PRINIVIL,ZESTRIL) 20 MG tablet, Take 20 mg by mouth Daily., Disp: , Rfl:   •  metFORMIN (GLUCOPHAGE) 500 MG tablet, Take 1,500 mg by mouth Every Night., Disp: , Rfl:   •  Omega-3 Fatty Acids (FISH OIL) 1000 MG capsule capsule, Take 1,000 mg by mouth Every Night., Disp: , Rfl:   •  pantoprazole (PROTONIX) 40 MG EC tablet, Take 1 tablet by mouth 2 (Two) Times a Day., Disp: 180 tablet, Rfl: 3  •  QUEtiapine (SEROquel) 100 MG tablet, Take 100 mg by mouth Daily., Disp: , Rfl:   •  rosuvastatin (CRESTOR) 20 MG tablet, Take 1 tablet by mouth Daily., Disp: 30 tablet, Rfl: 6    Allergies: No Known Allergies    The following portions of the patient's history were reviewed and updated as appropriate: allergies, current medications, past family history, past medical history, past social history, past surgical history and problem list.        Objective    Objective      Vital Signs: There were no vitals filed for this visit.    Ortho Exam:  LEFT shoulder  AROM still limited  Pain better  SLT intact  Ax intact    Results  Review:  Imaging Results (Last 24 Hours)     ** No results found for the last 24 hours. **          Procedures          Assessment / Plan      Assessment/Plan:   Problem List Items Addressed This Visit        Other    Nontraumatic complete tear of left rotator cuff    Impingement syndrome of left shoulder    Overview     Added automatically from request for surgery [id number]         Bursitis of left shoulder    Overview     Added automatically from request for surgery [id number]         Contracture of joint of left shoulder region    Overview     Added automatically from request for surgery [id number]         Status post arthroscopy of left shoulder - Primary        LEFT shoulder - improvements noted  He understands he may need RSA in the future but should try to prolong as much as possible    He is also having trouble with his knees and going to see Dr. Pelaez.    Physical therapy-provided the scapular program and Reading program again.  He continues to desire to do this on his own a strong encouraged him to work on on this    Follow Up:   3 months    Eduar Alves MD, [facility]OS  Orthopedic Surgeon  Fellowship Trained Shoulder and Elbow Surgeon  [facility]  Orthopedics and Sports Medicine  [address] [place] [address], [address]  [place] [address]    01/05/21  10:24 EST    Please note that portions of this note may have been completed with a voice recognition program. Efforts were made to edit the dictations, but occasionally words are mistranscribed.

## 2021-01-12 NOTE — PROGRESS NOTES
OFFICE VISIT  NOTE  [facility] CARDIOLOGY      Name: Roaul Roberto    Date: 1/15/2021  MRN:  [id number]  :  1955      REFERRING/PRIMARY PROVIDER:  Artem Morgan MD    Chief Complaint   Patient presents with   • Coronary artery disease involving native coronary artery of        HPI: Raoul Roberto is a 65 y.o. male who presents today for new consultation to establish care for CAD and history of AVNRT.  PCI of RCA 2016 by Dr. Plasencia in [place], residual 30-40% LAD disease.  AVNRT ablation by Dr. Louis .  Patient is doing well, no chest pain or palpitations.  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  Does not formally exercise due to osteoarthritis of the knee.  Left shoulder surgery in the past by Dr. Alves.    Past Medical History:   Diagnosis Date   • Anxiety and depression    • Arthritis    • Atrial fibrillation (CMS/HCC)    • Coronary artery disease    • Diabetes mellitus (CMS/HCC)    • GERD (gastroesophageal reflux disease)    • Hyperlipidemia    • Hypertension    • Kidney stone    • Seasonal allergies    • Sleep apnea     has not worn bipap in a few months    • SVT (supraventricular tachycardia) (CMS/HCC)        Past Surgical History:   Procedure Laterality Date   • CARDIAC CATHETERIZATION     • CARDIAC ELECTROPHYSIOLOGY PROCEDURE N/A 2019    Procedure: EPS+/-RFA. Stop Sotalol 4 days prior. DNS meds.;  Surgeon: Jr Louis MD;  Location: [facility] EP INVASIVE LOCATION;  Service: Cardiology   • COLONOSCOPY N/A 10/2/2018    Procedure: COLONOSCOPY;  Surgeon: Guille Addison MD;  Location:  [facility];  Service: Gastroenterology   • CORONARY ANGIOPLASTY WITH STENT PLACEMENT     • ENDOSCOPY N/A 10/2/2018    Procedure: ESOPHAGOGASTRODUODENOSCOPY;  Surgeon: Guille Addison MD;  Location:  [facility];  Service: Gastroenterology   • KNEE SURGERY Right     1973   • ROTATOR CUFF REPAIR Right     • SHOULDER ARTHROSCOPY W/ ROTATOR CUFF REPAIR Left 10/19/2020    Procedure: SHOULDER ARTHROSCOPY WITH EXTENSIVE DEBRIDEMENT AND SUBACROMIAL DECOMPRESSION;  Surgeon: Eduar Alves MD;  Location: [facility];  Service: Orthopedics;  Laterality: Left;       Social History     Socioeconomic History   • Marital status:      Spouse name: Not on file   • Number of children: Not on file   • Years of education: Not on file   • Highest education level: Not on file   Tobacco Use   • Smoking status: Never Smoker   • Smokeless tobacco: Never Used   Substance and Sexual Activity   • Alcohol use: Yes     Comment: rare   • Drug use: No   • Sexual activity: Defer       Family History   Problem Relation Age of Onset   • Diabetes Mother    • Breast cancer Mother    • Cancer Mother    • Osteoarthritis Mother    • Hypertension Mother    • Heart attack Father         3 times   • Prostate cancer Father    • Aneurysm Father         abdomen   • Cancer Father    • Hypertension Father         ROS:   Constitutional no fever,  no weight loss   Skin no rash, no subcutaneous nodules   Otolaryngeal no difficulty swallowing   Cardiovascular See HPI   Pulmonary no cough, no sputum production   Gastrointestinal no constipation, no diarrhea   Genitourinary no dysuria, no hematuria   Hematologic no easy bruisability, no abnormal bleeding   Musculoskeletal no muscle pain   Neurologic no dizziness, no falls         No Known Allergies      Current Outpatient Medications:   •  aspirin 81 MG EC tablet, Take 81 mg by mouth Daily., Disp: , Rfl:   •  buPROPion (WELLBUTRIN) 100 MG tablet, Take 100 mg by mouth Daily., Disp: , Rfl:   •  buPROPion SR (WELLBUTRIN SR) 200 MG 12 hr tablet, Take 200 mg by mouth Every Morning., Disp: , Rfl:   •  doxepin (SINEquan) 10 MG capsule, Take 10 mg by mouth Daily., Disp: , Rfl:   •  FLUoxetine (PROzac) 20 MG capsule, Take 80 mg by mouth Daily., Disp: , Rfl:   •  lisinopril (PRINIVIL,ZESTRIL) 20 MG tablet, Take  "20 mg by mouth Daily., Disp: , Rfl:   •  metFORMIN (GLUCOPHAGE) 500 MG tablet, Take 1,500 mg by mouth Every Night., Disp: , Rfl:   •  Omega-3 Fatty Acids (FISH OIL) 1000 MG capsule capsule, Take 1,000 mg by mouth Every Night., Disp: , Rfl:   •  pantoprazole (PROTONIX) 40 MG EC tablet, Take 1 tablet by mouth 2 (Two) Times a Day., Disp: 180 tablet, Rfl: 3  •  QUEtiapine (SEROquel) 200 MG tablet, Take 200 mg by mouth Daily., Disp: , Rfl:   •  rosuvastatin (CRESTOR) 20 MG tablet, Take 1 tablet by mouth Daily., Disp: 30 tablet, Rfl: 6    Vitals:    01/15/21 0857   BP: 130/82   BP Location: Right arm   Patient Position: Sitting   Cuff Size: Large Adult   Pulse: 104   Temp: 97.5 °F (36.4 °C)   SpO2: 96%   Weight: (!) 142 kg (312 lb)   Height: 203.2 cm (80\")     Body mass index is 34.27 kg/m².    PHYSICAL EXAM:    General Appearance:   · well developed  · well nourished  HENT:   · oropharynx moist  · lips not cyanotic  Neck:  · thyroid not enlarged  · supple  Respiratory:  · no respiratory distress  · normal breath sounds  · no rales  Cardiovascular:  · no jugular venous distention  · regular rhythm  · apical impulse normal  · S1 normal, S2 normal  · no S3, no S4   · no murmur  · no rub, no thrill  · carotid pulses normal; no bruit  · pedal pulses normal  · lower extremity edema: none    Gastrointestinal:   · bowel sounds normal  · non-tender  · no hepatomegaly, no splenomegaly  Musculoskeletal:  · no clubbing of fingers.   · normocephalic, head atraumatic  Skin:   · warm, dry  Psychiatric:  · judgement and insight appropriate  · normal mood and affect    RESULTS:   Procedures           Labs:  Lab Results   Component Value Date    AST 27 10/08/2020    ALT 36 10/08/2020     Lab Results   Component Value Date    HGBA1C 6.70 (H) 10/08/2020     No components found for: CREATINININE  eGFR Non  Amer   Date Value Ref Range Status   10/08/2020 81 >60 mL/min/1.73 Final   08/22/2019 71 >60 mL/min/1.73 Final   07/21/2018 60 (L) " >60 mL/min/1.73 Final         ASSESSMENT:  Problem List Items Addressed This Visit        Other    Essential hypertension - Primary    Coronary artery disease involving native coronary artery of native heart without angina pectoris    Overview     a. Nuclear Stress Test 1/7/16: moderate in size/severity fixed apical defect with small fixed inferior defect, normal overall systolic function  b. LHC:   40% diagonal artery disease and 70% mid RCA stenosis treated with Rebel bare metal stent to Mid RCA. Dr. Wei 1/11/16 [facility]. Normal EF         Paroxysmal SVT (supraventricular tachycardia) (CMS/HCC)    Overview     c. CHADSVasc = 3 (DM2, CAD, HTN)  on Eliquis for questionable diagnosis of atrial fibrillation  d. ER visit 5/12/2016 [facility] - presented with SVT at a rate of 168 bpm, converted with 6 mg adenosine  e. Echocardiogram 5/12/2016: EF 55%, concentric LVH, LA size 4.2 cm, no significant valvular disease  f. Nuclear Stress Test 1/7/16: moderate in size/severity fixed apical defect with small fixed inferior defect, normal overall systolic function  g. 3/2018: Holter Monitor from outside hospital reported atrial fibrillation with RVR. HR  bpm, average 72 bpm, strips available for review do not show any evidence of atrial fibrillation  h. Echocardiogram 9/25/18: EF 55-60%, LA moderately dilated. 3.8 cm. Mild MR  asha Luis 7/2019 showed SVT at 150 bpm  j. 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 Louis                PLAN:    1.  CAD:  PCI of RCA 2016, residual 30 is 40% LAD  Continue aspirin and rosuvastatin  Check labs from PCP last week  Encourage increase exercise and cardiac diet    2.  AVNRT:  Status post ablation Dr. Louis 8/2019  Asymptomatic currently  Echo 9/2018 EF 55-60%, mild MR, moderate LAE    3.  Mild to moderate mitral vegetation:  Asymptomatic currently, repeat echo if patient develops worsening shortness of breath    4.  Tinnitus and dizziness:  Patient reports  tinnitus and occasional balance  Recommend ENT evaluation, patient will think about it after his next trip.    Return to clinic in 6 months, or sooner as needed.    Thank you for the opportunity to share in the care of your patient; please do not hesitate to call me with any questions.     Quintin العراقي MD, [facility]  Office: [phone] [address] [place] [address]    01/15/21

## 2021-01-15 ENCOUNTER — CONSULT (OUTPATIENT)
Dept: CARDIOLOGY | Facility: CLINIC | Age: 66
End: 2021-01-15

## 2021-01-15 VITALS
HEIGHT: 78 IN | SYSTOLIC BLOOD PRESSURE: 130 MMHG | OXYGEN SATURATION: 96 % | HEART RATE: 104 BPM | WEIGHT: 312 LBS | DIASTOLIC BLOOD PRESSURE: 82 MMHG | BODY MASS INDEX: 36.1 KG/M2 | TEMPERATURE: 97.5 F

## 2021-01-15 DIAGNOSIS — I47.1 PAROXYSMAL SVT (SUPRAVENTRICULAR TACHYCARDIA) (HCC): ICD-10-CM

## 2021-01-15 DIAGNOSIS — I25.10 CORONARY ARTERY DISEASE INVOLVING NATIVE CORONARY ARTERY OF NATIVE HEART WITHOUT ANGINA PECTORIS: ICD-10-CM

## 2021-01-15 DIAGNOSIS — I10 ESSENTIAL HYPERTENSION: Primary | ICD-10-CM

## 2021-01-15 PROCEDURE — 99214 OFFICE O/P EST MOD 30 MIN: CPT | Performed by: INTERNAL MEDICINE

## 2021-01-15 RX ORDER — BUPROPION HYDROCHLORIDE 100 MG/1
100 TABLET ORAL DAILY
COMMUNITY

## 2021-04-06 ENCOUNTER — TELEPHONE (OUTPATIENT)
Dept: ORTHOPEDIC SURGERY | Facility: CLINIC | Age: 66
End: 2021-04-06

## 2021-09-20 ENCOUNTER — TELEPHONE (OUTPATIENT)
Dept: CARDIOLOGY | Facility: CLINIC | Age: 66
End: 2021-09-20

## 2021-09-20 NOTE — TELEPHONE ENCOUNTER
Patient called and left a message. I tried to call him back. No answer. I did not get the option to leave a message.

## 2021-10-04 NOTE — TELEPHONE ENCOUNTER
I spoke with the patient and made him an appt for Tuesday with Dr. Alves per Dr. Pelaez.    Riya   yes

## 2025-01-06 NOTE — PROGRESS NOTES
[facility]  INITIAL OFFICE CONSULT      Raoul Roberto  1955  PCP: Artem Morgan MD    SUBJECTIVE:   Raoul Roberto is a 64 y.o. male seen for a consultation visit regarding the following:     Chief Complaint:   Chief Complaint   Patient presents with   • Paroxysmal SVT (supraventricular tachycardia) (CMS/[facility]          Consultation is requested by No ref. provider found for evaluation of Paroxysmal SVT (supraventricular tachycardia) (CMS/[facility]        History:  Pleasant 64 year old white male presents for pre op evaluation regarding need for left shoulder surgery.  The patient has had SVT in the past treated by Dr. Louis with EPS and RFA of AVNRT. He has had no recurrent episodes of palpitations since that time.  In addition he has had a remote history of Chest pain and abnormal Stress in 2016. This lead to a LHC that revealed 30% Diagonal disease and 70% RCA treated with BMS Dr. Wei in [facility]. Since then he has followed with Dr. Parsons in [place], [place] but has decided to follow with [facility]. He has not seen Dr. Parsons for some time.  He has not had any chest pain or sob suggesting recurrent Angina. He is very active with his work and has no chest pain with these activities. 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 He denies any CHF symptoms.     Cardiac PMH: (Old records have been reviewed and summarized below)  1. SVT  a. CHADSVasc = 3 (DM2, CAD, HTN)  on Eliquis for questionable diagnosis of atrial fibrillation  b. [facility] with SVT at a rate of 168 bpm, converted with 6 mg adenosine  c. Echocardiogram 5/12/2016: EF 55%, concentric LVH, LA size 4.2 cm, no significant valvular disease  d. Nuclear Stress Test 1/7/16: moderate in size/severity fixed apical defect with small fixed inferior defect, normal overall systolic function  e. 3/2018:  Holter Monitor from outside hospital reported atrial fibrillation with RVR. HR  bpm, average 72 bpm, strips available for review do not show any evidence of atrial fibrillation  f. Treated with Sotalol  g. Echocardiogram 9/25/18: EF 55-60%, LA moderately dilated. 3.8 cm. Mild MR  ivan Luis 7/2019 showed SVT at 150 bpm  i. 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 Louis   2. CAD:  a. Chest pain  b. Nuclear Stress Test 1/7/16: moderate in size/severity fixed apical defect with small fixed inferior defect, normal overall systolic function  c. LHC:   40% diagonal artery disease and 70% mid RCA stenosis treated with Rebel bare metal stent to Mid RCA. Dr. Wei 1/11/16 [facility]. Normal EF  3. HTN  4. DM2  5. HLP  6. GERD  7. Peripheral neuropathy  8. Pituitary tumor - data deficit  9. SENTHIL - on bipap  10. Surgical History:  a. Right knee surgery  b. Kidney stone extraction         Past Medical History, Past Surgical History, Family history, Social History, and Medications were all reviewed with the patient today and updated as necessary.     Current Outpatient Medications   Medication Sig Dispense Refill   • aspirin 81 MG EC tablet Take 81 mg by mouth Daily.     • buPROPion (ZYBAN) 150 MG 12 hr tablet Take 150 mg by mouth Daily.     • doxepin (SINEquan) 10 MG capsule Take 10 mg by mouth Daily.     • FLUoxetine (PROzac) 40 MG capsule TAKE ONE CAPSULE BY MOUTH EVERY EVENING BEFORE BEDTIME X 30 DAYS.  0   • lisinopril (PRINIVIL,ZESTRIL) 20 MG tablet Take 20 mg by mouth Daily.     • metFORMIN (GLUCOPHAGE) 500 MG tablet Take 1,500 mg by mouth Every Night.     • Omega-3 Fatty Acids (FISH OIL) 1000 MG capsule capsule Take 1,000 mg by mouth Every Night.     • pantoprazole (PROTONIX) 40 MG EC tablet Take 1 tablet by mouth 2 (Two) Times a Day. 180 tablet 3   • QUEtiapine (SEROquel) 100 MG tablet Take 100 mg by mouth Daily.       No current facility-administered medications for this visit.      No Known Allergies      Past Medical  Vaccine status unknown History:   Diagnosis Date   • Arthritis    • Atrial fibrillation (CMS/HCC)    • Diabetes mellitus (CMS/HCC)    • Hyperlipidemia    • Hypertension    • Kidney stone    • Seasonal allergies      Past Surgical History:   Procedure Laterality Date   • CARDIAC ELECTROPHYSIOLOGY PROCEDURE N/A 8/22/2019    Procedure: EPS+/-RFA. Stop Sotalol 4 days prior. DNS meds.;  Surgeon: Jr Louis MD;  Location:  [facility] EP INVASIVE LOCATION;  Service: Cardiology   • COLONOSCOPY N/A 10/2/2018    Procedure: COLONOSCOPY;  Surgeon: Guille Addison MD;  Location:  [facility];  Service: Gastroenterology   • CORONARY ANGIOPLASTY WITH STENT PLACEMENT     • ENDOSCOPY N/A 10/2/2018    Procedure: ESOPHAGOGASTRODUODENOSCOPY;  Surgeon: Guille Addison MD;  Location:  [facility];  Service: Gastroenterology   • KNEE SURGERY     • ROTATOR CUFF REPAIR Right      Family History   Problem Relation Age of Onset   • Diabetes Mother    • Breast cancer Mother    • Cancer Mother    • Osteoarthritis Mother    • Hypertension Mother    • Heart attack Father         3 times   • Prostate cancer Father    • Aneurysm Father         abdomen   • Cancer Father    • Hypertension Father      Social History     Tobacco Use   • Smoking status: Never Smoker   • Smokeless tobacco: Never Used   Substance Use Topics   • Alcohol use: Yes     Comment: rare       ROS:  Review of Symptoms:  General: no recent weight loss/gain, weakness or fatigue  Skin: no rashes, lumps, or other skin changes  HEENT: no dizziness, lightheadedness, or vision changes  Respiratory: no cough or hemoptysis  Cardiovascular: no palpitations, and tachycardia  Gastrointestinal: no black/tarry stools or diarrhea  Urinary: no change in frequency or urgency  Peripheral Vascular: no claudication or leg cramps  Musculoskeletal: no muscle or joint pain/stiffness  Psychiatric: no depression or excessive stress  Neurological: no sensory or motor loss, no syncope  Hematologic: no anemia, easy  "bruising or bleeding  Endocrine: no thyroid problems, nor heat or cold intolerance         PHYSICAL EXAM:   /68 (BP Location: Left arm, Patient Position: Sitting)   Pulse 78   Ht 203.2 cm (80\")   Wt (!) 141 kg (311 lb)   BMI 34.17 kg/m²      Wt Readings from Last 5 Encounters:   09/24/20 (!) 141 kg (311 lb)   09/22/20 (!) 147 kg (324 lb 1.2 oz)   12/18/19 (!) 147 kg (324 lb)   08/22/19 (!) 145 kg (320 lb)   07/24/19 (!) 148 kg (325 lb 6.4 oz)     BP Readings from Last 5 Encounters:   09/24/20 112/68   12/18/19 130/78   08/23/19 123/88   07/24/19 120/88   12/11/18 132/78       General-Well Nourished, Well developed  Eyes - PERRLA  Neck- supple, No mass  CV- regular rate and rhythm, no MRG  Lung- clear bilaterally  Abd- soft, +BS  Musc/skel - Norm strength and range of motion  Skin- warm and dry  Neuro - Alert & Oriented x 3, appropriate mood.    Medical problems and test results were reviewed with the patient today.     Results for orders placed or performed during the hospital encounter of 08/22/19   Protime-INR    Specimen: Blood   Result Value Ref Range    Protime 14.2 11.2 - 14.3 Seconds    INR 1.16 0.85 - 1.16   Basic Metabolic Panel    Specimen: Blood   Result Value Ref Range    Glucose 131 (H) 65 - 99 mg/dL    BUN 17 8 - 23 mg/dL    Creatinine 1.06 0.76 - 1.27 mg/dL    Sodium 140 136 - 145 mmol/L    Potassium 4.5 3.5 - 5.2 mmol/L    Chloride 102 98 - 107 mmol/L    CO2 23.0 22.0 - 29.0 mmol/L    Calcium 9.8 8.6 - 10.5 mg/dL    eGFR Non African Amer 71 >60 mL/min/1.73    BUN/Creatinine Ratio 16.0 7.0 - 25.0    Anion Gap 15.0 5.0 - 15.0 mmol/L   CBC (No Diff)    Specimen: Blood   Result Value Ref Range    WBC 4.05 3.40 - 10.80 10*3/mm3    RBC 4.50 4.14 - 5.80 10*6/mm3    Hemoglobin 13.9 13.0 - 17.7 g/dL    Hematocrit 42.6 37.5 - 51.0 %    MCV 94.7 79.0 - 97.0 fL    MCH 30.9 26.6 - 33.0 pg    MCHC 32.6 31.5 - 35.7 g/dL    RDW 11.9 (L) 12.3 - 15.4 %    RDW-SD 41.3 37.0 - 54.0 fl    MPV 11.1 6.0 - 12.0 fL "    Platelets 134 (L) 140 - 450 10*3/mm3   POC Glucose Once    Specimen: Blood   Result Value Ref Range    Glucose 98 70 - 130 mg/dL         No results found for: CHOL, HDL, HDLC, LDL, LDLC, VLDL    EKG:  (EKG/Tracing has been independently visualized by me and summarized below)      ECG 12 Lead    Date/Time: 9/24/2020 9:27 AM  Performed by: Kevin Ash PA  Authorized by: Kevin Ash PA   Comparison: not compared with previous ECG   Rhythm: sinus rhythm  Rate: normal  Conduction: conduction normal  ST Segments: ST segments normal  T Waves: T waves normal  QRS axis: normal  Other: no other findings    Clinical impression: normal ECG            ASSESSMENT   1. SVT: Remote RFA AVNRT 8/22/19 Dr. Louis . No recurrent Palpitations.   2. CAD: LHC for abnormal MPS with LHC and  Mid RCA BMS, Normal EF Dr. Geneva SÁNCHEZ 1/ 2016. No recurrent angina symptoms  3. HTN: On ACE  4. HLD: restart Statin patient stopped for unknown reasons.   5. DM Type II, HbA1c 7.1  6. SENTHIL-Bipap  7. Left Shoulder pain, plan for surgery       PLAN  · Continue to focus on risk factor modification including Bp control, DM control and restarting Statin that he stopped for unknown reasons. Follow up CMP, FLP 2 months.  · Patient has no ongoing angina or CHF symptoms. He is a Mild to moderate CV risk for shoulder repair.   · Will establish patient with Dr. العراقي for continued CV care in 3 months per patients request.      Cardiology/Electrophysiology  09/24/20  09:08 EDT  Will Nilsa LYON

## (undated) DEVICE — ADULT, W/LG. BACK PAD, RADIOTRANSPARENT ELEMENT AND LEAD WIRE: Brand: DEFIBRILLATION ELECTRODES

## (undated) DEVICE — LIMB HOLDER, WRIST/ANKLE: Brand: DEROYAL

## (undated) DEVICE — THE BITE BLOCK MAXI, LATEX FREE STRAP IS USED TO PROTECT THE ENDOSCOPE INSERTION TUBE FROM BEING BITTEN BY THE PATIENT.

## (undated) DEVICE — INTRO SHEATH FASTCATH SRO .038IN 8.5F 63CM

## (undated) DEVICE — Device: Brand: WEBSTER

## (undated) DEVICE — SUT ETHLN 3/0 FS1 30IN 669H

## (undated) DEVICE — SINGLE-USE POLYPECTOMY SNARE: Brand: SENSATION SHORT THROW

## (undated) DEVICE — SPNG GZ WOVN 4X4IN 12PLY 10/BX STRL

## (undated) DEVICE — INTRO SHEATH ENGAGE W/50 GW .038 7F12

## (undated) DEVICE — CLEAR-TRAC THREADED CANNULA, 8.0                                    MM I.D., 76 MM LONG, GREEN, LATEX SEAL: Brand: CLEAR-TRAC

## (undated) DEVICE — DECANT BG O JET

## (undated) DEVICE — NEEDLE, QUINCKE, 18GX3.5": Brand: MEDLINE

## (undated) DEVICE — UNDERGLV SURG BIOGEL INDICATOR LF PF 7.5

## (undated) DEVICE — PK MAJ SHLDR SPLT 10

## (undated) DEVICE — CATH QUAD CRD 6F5MM

## (undated) DEVICE — SET PRIMARY GRVTY 10DP MALE LL 104IN

## (undated) DEVICE — ST INF PRI SMRTSTE 20DRP 2VLV 24ML 117

## (undated) DEVICE — THE SINGLE USE ETRAP – POLYP TRAP IS USED FOR SUCTION RETRIEVAL OF ENDOSCOPICALLY REMOVED POLYPS.: Brand: ETRAP

## (undated) DEVICE — SHOULDER STABILIZATION KIT,                                    DISPOSABLE 12 PER BOX

## (undated) DEVICE — CVR HNDL LIGHT RIGID

## (undated) DEVICE — TUBING, SUCTION, 1/4" X 10', STRAIGHT: Brand: MEDLINE

## (undated) DEVICE — ST EXT IV SMARTSITE 2VLV SP M LL 5ML IV1

## (undated) DEVICE — INTENDED FOR TISSUE SEPARATION, AND OTHER PROCEDURES THAT REQUIRE A SHARP SURGICAL BLADE TO PUNCTURE OR CUT.: Brand: BARD-PARKER ® STAINLESS STEEL BLADES

## (undated) DEVICE — SINGLE-USE BIOPSY FORCEPS: Brand: RADIAL JAW 4

## (undated) DEVICE — DRSNG PAD ABD 8X10IN STRL

## (undated) DEVICE — Device: Brand: EZ STEER NAV

## (undated) DEVICE — APPL CHLORAPREP TINTED 26ML TEAL

## (undated) DEVICE — CANN NASL CO2 DIVIDED A/

## (undated) DEVICE — 3M™ STERI-STRIP™ REINFORCED ADHESIVE SKIN CLOSURES, R1547, 1/2 IN X 4 IN (12 MM X 100 MM), 6 STRIPS/ENVELOPE: Brand: 3M™ STERI-STRIP™

## (undated) DEVICE — LEX ELECTRO PHYSIOLOGY: Brand: MEDLINE INDUSTRIES, INC.

## (undated) DEVICE — Device: Brand: REFERENCE PATCH CARTO 3

## (undated) DEVICE — CANN 5.5 WO/HOLES LTX FREE ORANGE

## (undated) DEVICE — SOL NACL 0.9PCT 1000ML

## (undated) DEVICE — GOWN,REINFORCE,POLY,ECLIPSE,TOWEL,XLG: Brand: MEDLINE

## (undated) DEVICE — T-MAX DISPOSABLE FACE MASK 8 PER BOX

## (undated) DEVICE — TP NDL SCORPION MULTIFIRE

## (undated) DEVICE — 4.5 ELITE, VULCAN                                    GENERATOR-COMPATIBLE ELECTROBLADE                                    RESECTORS, MAROON, PACKAGED 3 PER                                    BOX, STERILE

## (undated) DEVICE — DRSNG SURESITE123 4X4.8IN

## (undated) DEVICE — PUMP PAIN AUTOFUSER AUTO SELCT NOBOLUS 1TO14ML/HR 550ML DISP

## (undated) DEVICE — 1010 S-DRAPE TOWEL DRAPE 10/BX: Brand: STERI-DRAPE™

## (undated) DEVICE — DYONICS 25 INFLOW TUBE SET, 3 PER BOX

## (undated) DEVICE — GLV SURG SENSICARE PI ORTHO SZ7.5 LF STRL

## (undated) DEVICE — Device: Brand: DEFENDO AIR/WATER/SUCTION AND BIOPSY VALVE